# Patient Record
Sex: MALE | Race: WHITE
[De-identification: names, ages, dates, MRNs, and addresses within clinical notes are randomized per-mention and may not be internally consistent; named-entity substitution may affect disease eponyms.]

---

## 2018-01-19 ENCOUNTER — HOSPITAL ENCOUNTER (OUTPATIENT)
Age: 68
End: 2018-01-19
Payer: MEDICARE

## 2018-01-19 DIAGNOSIS — R10.30: Primary | ICD-10-CM

## 2018-01-19 PROCEDURE — 74176 CT ABD & PELVIS W/O CONTRAST: CPT

## 2018-08-07 ENCOUNTER — HOSPITAL ENCOUNTER (OUTPATIENT)
Age: 68
End: 2018-08-07
Payer: MEDICARE

## 2018-08-07 DIAGNOSIS — R94.31: ICD-10-CM

## 2018-08-07 DIAGNOSIS — R06.09: Primary | ICD-10-CM

## 2018-08-07 DIAGNOSIS — R42: ICD-10-CM

## 2018-08-07 PROCEDURE — A9502 TC99M TETROFOSMIN: HCPCS

## 2018-08-07 PROCEDURE — 78452 HT MUSCLE IMAGE SPECT MULT: CPT

## 2018-08-07 PROCEDURE — 93306 TTE W/DOPPLER COMPLETE: CPT

## 2018-08-07 PROCEDURE — 93017 CV STRESS TEST TRACING ONLY: CPT

## 2018-10-11 ENCOUNTER — HOSPITAL ENCOUNTER (OUTPATIENT)
Age: 68
End: 2018-10-11
Payer: MEDICARE

## 2018-10-11 DIAGNOSIS — Z12.5: Primary | ICD-10-CM

## 2018-10-11 DIAGNOSIS — N39.9: ICD-10-CM

## 2018-10-11 DIAGNOSIS — N40.0: ICD-10-CM

## 2018-10-11 PROCEDURE — 84153 ASSAY OF PSA TOTAL: CPT

## 2018-10-11 PROCEDURE — 84154 ASSAY OF PSA FREE: CPT

## 2018-10-11 PROCEDURE — 36415 COLL VENOUS BLD VENIPUNCTURE: CPT

## 2018-10-12 LAB — PSA, FREE: 0.11 NG/ML

## 2019-07-16 ENCOUNTER — HOSPITAL ENCOUNTER (OUTPATIENT)
Age: 69
End: 2019-07-16
Payer: MEDICARE

## 2019-07-16 DIAGNOSIS — M54.6: Primary | ICD-10-CM

## 2019-07-16 PROCEDURE — 72072 X-RAY EXAM THORAC SPINE 3VWS: CPT

## 2019-07-16 PROCEDURE — 71046 X-RAY EXAM CHEST 2 VIEWS: CPT

## 2019-07-16 PROCEDURE — 71101 X-RAY EXAM UNILAT RIBS/CHEST: CPT

## 2019-09-13 ENCOUNTER — ON CAMPUS - OUTPATIENT (OUTPATIENT)
Dept: RURAL HOSPITAL 6 | Facility: HOSPITAL | Age: 69
End: 2019-09-13

## 2019-09-13 DIAGNOSIS — K64.1 SECOND DEGREE HEMORRHOIDS: ICD-10-CM

## 2019-09-13 DIAGNOSIS — D12.0 BENIGN NEOPLASM OF CECUM: ICD-10-CM

## 2019-09-13 DIAGNOSIS — D12.3 BENIGN NEOPLASM OF TRANSVERSE COLON: ICD-10-CM

## 2019-09-13 DIAGNOSIS — D12.5 BENIGN NEOPLASM OF SIGMOID COLON: ICD-10-CM

## 2019-09-13 DIAGNOSIS — R63.4 ABNORMAL WEIGHT LOSS: ICD-10-CM

## 2019-09-13 DIAGNOSIS — R10.30 LOWER ABDOMINAL PAIN, UNSPECIFIED: ICD-10-CM

## 2019-09-13 PROCEDURE — 45385 COLONOSCOPY W/LESION REMOVAL: CPT | Performed by: INTERNAL MEDICINE

## 2020-01-15 ENCOUNTER — HOSPITAL ENCOUNTER (OUTPATIENT)
Age: 70
End: 2020-01-15
Payer: MEDICARE

## 2020-01-15 DIAGNOSIS — Z12.2: ICD-10-CM

## 2020-01-15 DIAGNOSIS — Z87.891: Primary | ICD-10-CM

## 2020-02-06 ENCOUNTER — HOSPITAL ENCOUNTER (OUTPATIENT)
Age: 70
End: 2020-02-06
Payer: MEDICARE

## 2020-02-06 DIAGNOSIS — N13.9: Primary | ICD-10-CM

## 2020-02-06 PROCEDURE — 87086 URINE CULTURE/COLONY COUNT: CPT

## 2020-02-06 PROCEDURE — 87186 SC STD MICRODIL/AGAR DIL: CPT

## 2020-02-06 PROCEDURE — 87088 URINE BACTERIA CULTURE: CPT

## 2020-08-05 ENCOUNTER — HOSPITAL ENCOUNTER (OUTPATIENT)
Dept: HOSPITAL 22 - PT | Age: 70
Discharge: HOME | End: 2020-08-05
Payer: MEDICARE

## 2020-08-05 DIAGNOSIS — L89.301: Primary | ICD-10-CM

## 2020-08-05 PROCEDURE — 97162 PT EVAL MOD COMPLEX 30 MIN: CPT

## 2020-10-21 ENCOUNTER — HOSPITAL ENCOUNTER (OUTPATIENT)
Age: 70
End: 2020-10-21
Payer: MEDICARE

## 2020-10-21 DIAGNOSIS — L73.2: Primary | ICD-10-CM

## 2020-10-21 DIAGNOSIS — Z01.818: ICD-10-CM

## 2020-10-21 LAB
ALBUMIN LEVEL: 3.9 G/DL (ref 3.5–5)
ALBUMIN/GLOB SERPL: 1.2 {RATIO} (ref 1.1–1.8)
ALP ISO SERPL-ACNC: 91 U/L (ref 38–126)
ALT SERPLBLD-CCNC: 11 U/L (ref 12–78)
ANION GAP SERPL CALC-SCNC: 13.5 MEQ/L (ref 5–15)
AST SERPL QL: 22 U/L (ref 17–59)
BILIRUBIN,TOTAL: 0.3 MG/DL (ref 0.2–1.3)
BUN SERPL-MCNC: 29 MG/DL (ref 9–20)
CALCIUM SPEC-MCNC: 8.9 MG/DL (ref 8.4–10.2)
CHLORIDE SPEC-SCNC: 111 MMOL/L (ref 98–107)
CO2 SERPL-SCNC: 23 MMOL/L (ref 22–30)
CREAT BLD-SCNC: 3.2 MG/DL (ref 0.66–1.25)
ESTIMATED GLOMERULAR FILT RATE: 19 ML/MIN (ref 60–?)
GFR (AFRICAN AMERICAN): 23 ML/MIN (ref 60–?)
GLOBULIN SER CALC-MCNC: 3.3 G/DL (ref 1.3–3.2)
GLUCOSE: 96 MG/DL (ref 74–100)
HCT VFR BLD CALC: 36.9 % (ref 42–52)
HGB BLD-MCNC: 11.3 G/DL (ref 14.1–18)
MCHC RBC-ENTMCNC: 30.7 G/DL (ref 31.8–35.4)
MCV RBC: 93.9 FL (ref 80–94)
MEAN CORPUSCULAR HEMOGLOBIN: 28.9 PG (ref 27–31.2)
PLATELET # BLD: 278 K/MM3 (ref 142–424)
POTASSIUM: 4.5 MMOL/L (ref 3.5–5.1)
PROT SERPL-MCNC: 7.2 G/DL (ref 6.3–8.2)
RBC # BLD AUTO: 3.93 M/MM3 (ref 4.6–6.2)
SODIUM SPEC-SCNC: 143 MMOL/L (ref 136–145)
WBC # BLD AUTO: 13.1 K/MM3 (ref 4.8–10.8)

## 2020-10-21 PROCEDURE — 86328 IA NFCT AB SARSCOV2 COVID19: CPT

## 2020-10-21 PROCEDURE — 93005 ELECTROCARDIOGRAM TRACING: CPT

## 2020-10-21 PROCEDURE — 80053 COMPREHEN METABOLIC PANEL: CPT

## 2020-10-21 PROCEDURE — 36415 COLL VENOUS BLD VENIPUNCTURE: CPT

## 2020-10-21 PROCEDURE — 85025 COMPLETE CBC W/AUTO DIFF WBC: CPT

## 2020-10-23 ENCOUNTER — HOSPITAL ENCOUNTER (OUTPATIENT)
Dept: HOSPITAL 22 - OR | Age: 70
Discharge: HOME | End: 2020-10-23
Payer: MEDICARE

## 2020-10-23 VITALS
OXYGEN SATURATION: 97 % | DIASTOLIC BLOOD PRESSURE: 65 MMHG | HEART RATE: 69 BPM | RESPIRATION RATE: 16 BRPM | TEMPERATURE: 97.52 F | SYSTOLIC BLOOD PRESSURE: 124 MMHG

## 2020-10-23 VITALS
RESPIRATION RATE: 16 BRPM | DIASTOLIC BLOOD PRESSURE: 68 MMHG | TEMPERATURE: 98.7 F | SYSTOLIC BLOOD PRESSURE: 136 MMHG | OXYGEN SATURATION: 98 % | HEART RATE: 68 BPM

## 2020-10-23 VITALS
SYSTOLIC BLOOD PRESSURE: 139 MMHG | DIASTOLIC BLOOD PRESSURE: 62 MMHG | HEART RATE: 67 BPM | TEMPERATURE: 98.78 F | OXYGEN SATURATION: 98 % | RESPIRATION RATE: 16 BRPM

## 2020-10-23 VITALS — SYSTOLIC BLOOD PRESSURE: 139 MMHG | DIASTOLIC BLOOD PRESSURE: 62 MMHG | TEMPERATURE: 98.78 F | HEART RATE: 67 BPM

## 2020-10-23 VITALS
RESPIRATION RATE: 16 BRPM | HEART RATE: 62 BPM | DIASTOLIC BLOOD PRESSURE: 69 MMHG | SYSTOLIC BLOOD PRESSURE: 119 MMHG | OXYGEN SATURATION: 97 %

## 2020-10-23 VITALS
OXYGEN SATURATION: 97 % | RESPIRATION RATE: 18 BRPM | TEMPERATURE: 98.7 F | DIASTOLIC BLOOD PRESSURE: 74 MMHG | SYSTOLIC BLOOD PRESSURE: 131 MMHG | HEART RATE: 68 BPM

## 2020-10-23 VITALS
HEART RATE: 64 BPM | DIASTOLIC BLOOD PRESSURE: 88 MMHG | RESPIRATION RATE: 18 BRPM | SYSTOLIC BLOOD PRESSURE: 155 MMHG | OXYGEN SATURATION: 97 % | TEMPERATURE: 98.7 F

## 2020-10-23 VITALS
DIASTOLIC BLOOD PRESSURE: 66 MMHG | SYSTOLIC BLOOD PRESSURE: 106 MMHG | TEMPERATURE: 97.52 F | HEART RATE: 61 BPM | RESPIRATION RATE: 18 BRPM | OXYGEN SATURATION: 95 %

## 2020-10-23 VITALS
RESPIRATION RATE: 14 BRPM | HEART RATE: 62 BPM | OXYGEN SATURATION: 97 % | DIASTOLIC BLOOD PRESSURE: 64 MMHG | SYSTOLIC BLOOD PRESSURE: 117 MMHG

## 2020-10-23 VITALS
DIASTOLIC BLOOD PRESSURE: 73 MMHG | RESPIRATION RATE: 18 BRPM | SYSTOLIC BLOOD PRESSURE: 132 MMHG | OXYGEN SATURATION: 95 % | HEART RATE: 66 BPM | TEMPERATURE: 98.7 F

## 2020-10-23 VITALS
OXYGEN SATURATION: 97 % | SYSTOLIC BLOOD PRESSURE: 133 MMHG | RESPIRATION RATE: 16 BRPM | DIASTOLIC BLOOD PRESSURE: 96 MMHG | HEART RATE: 62 BPM

## 2020-10-23 VITALS — BODY MASS INDEX: 24.9 KG/M2

## 2020-10-23 VITALS — RESPIRATION RATE: 20 BRPM

## 2020-10-23 DIAGNOSIS — E78.5: ICD-10-CM

## 2020-10-23 DIAGNOSIS — E07.9: ICD-10-CM

## 2020-10-23 DIAGNOSIS — J44.9: ICD-10-CM

## 2020-10-23 DIAGNOSIS — Z83.438: ICD-10-CM

## 2020-10-23 DIAGNOSIS — I10: ICD-10-CM

## 2020-10-23 DIAGNOSIS — L02.31: ICD-10-CM

## 2020-10-23 DIAGNOSIS — Z72.0: ICD-10-CM

## 2020-10-23 DIAGNOSIS — L73.2: Primary | ICD-10-CM

## 2020-10-23 DIAGNOSIS — Z79.899: ICD-10-CM

## 2020-10-23 DIAGNOSIS — Z79.82: ICD-10-CM

## 2020-10-23 DIAGNOSIS — Z83.3: ICD-10-CM

## 2020-10-23 PROCEDURE — 87077 CULTURE AEROBIC IDENTIFY: CPT

## 2020-10-23 PROCEDURE — 88304 TISSUE EXAM BY PATHOLOGIST: CPT

## 2020-10-23 PROCEDURE — 10061 I&D ABSCESS COMP/MULTIPLE: CPT

## 2020-10-23 PROCEDURE — 96374 THER/PROPH/DIAG INJ IV PUSH: CPT

## 2020-10-23 PROCEDURE — 87186 SC STD MICRODIL/AGAR DIL: CPT

## 2020-10-23 PROCEDURE — 87070 CULTURE OTHR SPECIMN AEROBIC: CPT

## 2020-10-23 PROCEDURE — 87205 SMEAR GRAM STAIN: CPT

## 2020-10-23 PROCEDURE — 87075 CULTR BACTERIA EXCEPT BLOOD: CPT

## 2021-06-09 ENCOUNTER — HOSPITAL ENCOUNTER (OUTPATIENT)
Age: 71
End: 2021-06-09
Payer: MEDICARE

## 2021-06-09 DIAGNOSIS — E78.5: Primary | ICD-10-CM

## 2021-06-09 DIAGNOSIS — J43.1: ICD-10-CM

## 2021-06-09 DIAGNOSIS — N40.1: ICD-10-CM

## 2021-06-09 DIAGNOSIS — Z12.5: ICD-10-CM

## 2021-06-09 DIAGNOSIS — E03.9: ICD-10-CM

## 2021-06-09 LAB
ALBUMIN LEVEL: 4.2 G/DL (ref 3.5–5)
ALBUMIN/GLOB SERPL: 1.3 {RATIO} (ref 1.1–1.8)
ALP ISO SERPL-ACNC: 96 U/L (ref 38–126)
ALT SERPLBLD-CCNC: 12 U/L (ref 12–78)
ANION GAP SERPL CALC-SCNC: 16.4 MEQ/L (ref 5–15)
AST SERPL QL: 24 U/L (ref 17–59)
BILIRUBIN,TOTAL: 0.5 MG/DL (ref 0.2–1.3)
BUN SERPL-MCNC: 36 MG/DL (ref 9–20)
CALCIUM SPEC-MCNC: 9 MG/DL (ref 8.4–10.2)
CHLORIDE SPEC-SCNC: 106 MMOL/L (ref 98–107)
CHOLEST SPEC-SCNC: 167 MG/DL (ref 140–200)
CO2 SERPL-SCNC: 23 MMOL/L (ref 22–30)
CREAT BLD-SCNC: 2.9 MG/DL (ref 0.66–1.25)
ESTIMATED GLOMERULAR FILT RATE: 22 ML/MIN (ref 60–?)
GFR (AFRICAN AMERICAN): 26 ML/MIN (ref 60–?)
GLOBULIN SER CALC-MCNC: 3.2 G/DL (ref 1.3–3.2)
GLUCOSE: 96 MG/DL (ref 74–100)
HCT VFR BLD CALC: 38.5 % (ref 42–52)
HDLC SERPL-MCNC: 46 MG/DL (ref 40–60)
HGB BLD-MCNC: 12 G/DL (ref 14.1–18)
MCHC RBC-ENTMCNC: 31 G/DL (ref 31.8–35.4)
MCV RBC: 96.5 FL (ref 80–94)
MEAN CORPUSCULAR HEMOGLOBIN: 29.9 PG (ref 27–31.2)
PLATELET # BLD: 265 K/MM3 (ref 142–424)
POTASSIUM: 5.4 MMOL/L (ref 3.5–5.1)
PROT SERPL-MCNC: 7.4 G/DL (ref 6.3–8.2)
RBC # BLD AUTO: 4 M/MM3 (ref 4.6–6.2)
SODIUM SPEC-SCNC: 140 MMOL/L (ref 136–145)
TRIGLYCERIDES: 95 MG/DL (ref 30–150)
TSH SERPL-ACNC: 2.65 UIU/ML (ref 0.47–4.68)
WBC # BLD AUTO: 14.2 K/MM3 (ref 4.8–10.8)

## 2021-06-09 PROCEDURE — 85025 COMPLETE CBC W/AUTO DIFF WBC: CPT

## 2021-06-09 PROCEDURE — 80061 LIPID PANEL: CPT

## 2021-06-09 PROCEDURE — 80053 COMPREHEN METABOLIC PANEL: CPT

## 2021-06-09 PROCEDURE — 84443 ASSAY THYROID STIM HORMONE: CPT

## 2021-06-09 PROCEDURE — G0103 PSA SCREENING: HCPCS

## 2021-10-15 ENCOUNTER — HOSPITAL ENCOUNTER (OUTPATIENT)
Age: 71
End: 2021-10-15
Payer: MEDICARE

## 2021-10-15 DIAGNOSIS — Z12.2: ICD-10-CM

## 2021-10-15 DIAGNOSIS — Z87.891: Primary | ICD-10-CM

## 2021-10-15 PROCEDURE — 71271 CT THORAX LUNG CANCER SCR C-: CPT

## 2021-10-19 ENCOUNTER — HOSPITAL ENCOUNTER (OUTPATIENT)
Dept: HOSPITAL 22 - ER | Age: 71
Setting detail: OBSERVATION
LOS: 2 days | Discharge: HOME | End: 2021-10-21
Payer: MEDICARE

## 2021-10-19 VITALS — BODY MASS INDEX: 23.4 KG/M2 | BODY MASS INDEX: 23 KG/M2 | BODY MASS INDEX: 24.3 KG/M2

## 2021-10-19 VITALS — DIASTOLIC BLOOD PRESSURE: 68 MMHG | SYSTOLIC BLOOD PRESSURE: 114 MMHG | OXYGEN SATURATION: 95 % | HEART RATE: 68 BPM

## 2021-10-19 VITALS
RESPIRATION RATE: 18 BRPM | OXYGEN SATURATION: 94 % | TEMPERATURE: 98.42 F | DIASTOLIC BLOOD PRESSURE: 90 MMHG | SYSTOLIC BLOOD PRESSURE: 159 MMHG | HEART RATE: 83 BPM

## 2021-10-19 VITALS
HEART RATE: 68 BPM | RESPIRATION RATE: 20 BRPM | SYSTOLIC BLOOD PRESSURE: 120 MMHG | DIASTOLIC BLOOD PRESSURE: 66 MMHG | OXYGEN SATURATION: 95 %

## 2021-10-19 VITALS — OXYGEN SATURATION: 94 % | DIASTOLIC BLOOD PRESSURE: 71 MMHG | SYSTOLIC BLOOD PRESSURE: 128 MMHG | HEART RATE: 81 BPM

## 2021-10-19 VITALS
RESPIRATION RATE: 18 BRPM | TEMPERATURE: 99.32 F | OXYGEN SATURATION: 91 % | DIASTOLIC BLOOD PRESSURE: 70 MMHG | SYSTOLIC BLOOD PRESSURE: 114 MMHG | HEART RATE: 83 BPM

## 2021-10-19 VITALS — OXYGEN SATURATION: 93 % | DIASTOLIC BLOOD PRESSURE: 63 MMHG | HEART RATE: 76 BPM | SYSTOLIC BLOOD PRESSURE: 118 MMHG

## 2021-10-19 VITALS
RESPIRATION RATE: 18 BRPM | TEMPERATURE: 98.24 F | OXYGEN SATURATION: 99 % | SYSTOLIC BLOOD PRESSURE: 130 MMHG | DIASTOLIC BLOOD PRESSURE: 72 MMHG | HEART RATE: 81 BPM

## 2021-10-19 VITALS
SYSTOLIC BLOOD PRESSURE: 142 MMHG | DIASTOLIC BLOOD PRESSURE: 77 MMHG | RESPIRATION RATE: 20 BRPM | HEART RATE: 69 BPM | OXYGEN SATURATION: 95 %

## 2021-10-19 VITALS — OXYGEN SATURATION: 94 % | SYSTOLIC BLOOD PRESSURE: 120 MMHG | HEART RATE: 87 BPM | DIASTOLIC BLOOD PRESSURE: 66 MMHG

## 2021-10-19 VITALS — HEART RATE: 80 BPM

## 2021-10-19 DIAGNOSIS — I13.0: ICD-10-CM

## 2021-10-19 DIAGNOSIS — J44.0: ICD-10-CM

## 2021-10-19 DIAGNOSIS — I50.22: ICD-10-CM

## 2021-10-19 DIAGNOSIS — E11.22: ICD-10-CM

## 2021-10-19 DIAGNOSIS — E03.9: ICD-10-CM

## 2021-10-19 DIAGNOSIS — Z20.822: ICD-10-CM

## 2021-10-19 DIAGNOSIS — N18.4: ICD-10-CM

## 2021-10-19 DIAGNOSIS — K85.90: Primary | ICD-10-CM

## 2021-10-19 DIAGNOSIS — J20.9: ICD-10-CM

## 2021-10-19 DIAGNOSIS — Z79.899: ICD-10-CM

## 2021-10-19 DIAGNOSIS — Z99.81: ICD-10-CM

## 2021-10-19 DIAGNOSIS — N32.0: ICD-10-CM

## 2021-10-19 DIAGNOSIS — F17.210: ICD-10-CM

## 2021-10-19 LAB
ALBUMIN LEVEL: 3.7 G/DL (ref 3.5–5)
ALP ISO SERPL-ACNC: 78 U/L (ref 38–126)
ALT SERPLBLD-CCNC: 13 U/L (ref 12–78)
ANION GAP SERPL CALC-SCNC: 12.2 MEQ/L (ref 5–15)
AST SERPL QL: 27 U/L (ref 17–59)
BACTERIA URNS QL MICRO: (no result) /LPF
BILIRUB DIRECT SERPL-MCNC: 0.3 MG/DL (ref 0–0.4)
BILIRUB INDIRECT SERPL-MCNC: 0 MG/DL (ref 0–0.9)
BILIRUB INDIRECT SERPL-MCNC: 0 MG/DL (ref 0–1.1)
BILIRUBIN,TOTAL: 0.3 MG/DL (ref 0.2–1.3)
BUN SERPL-MCNC: 28 MG/DL (ref 9–20)
CALCIUM SPEC-MCNC: 8.5 MG/DL (ref 8.4–10.2)
CELLS COUNTED: 100
CHLORIDE SPEC-SCNC: 104 MMOL/L (ref 98–107)
CO2 SERPL-SCNC: 25 MMOL/L (ref 22–30)
COLOR UR: YELLOW
CREAT BLD-SCNC: 2.4 MG/DL (ref 0.66–1.25)
CREATININE CLEARANCE ESTIMATED: 32 ML/MIN (ref 50–200)
ESTIMATED GLOMERULAR FILT RATE: 27 ML/MIN (ref 60–?)
GFR (AFRICAN AMERICAN): 33 ML/MIN (ref 60–?)
GLUCOSE: 95 MG/DL (ref 74–100)
HCT VFR BLD CALC: 38 % (ref 42–52)
HGB BLD-MCNC: 11.6 G/DL (ref 14.1–18)
LEUKOCYTE ESTERASE UR QL STRIP: (no result)
LIPASE: 532 U/L (ref 23–300)
MANUAL DIFFERENTIAL: (no result)
MCHC RBC-ENTMCNC: 30.6 G/DL (ref 31.8–35.4)
MCV RBC: 96.8 FL (ref 80–94)
MEAN CORPUSCULAR HEMOGLOBIN: 29.7 PG (ref 27–31.2)
MICRO URNS: (no result)
PH UR: 7 [PH] (ref 5–8.5)
PLATELET # BLD: 292 K/MM3 (ref 142–424)
POTASSIUM: 4.2 MMOL/L (ref 3.5–5.1)
PROT SERPL-MCNC: 7.1 G/DL (ref 6.3–8.2)
RBC # BLD AUTO: 3.93 M/MM3 (ref 4.6–6.2)
SODIUM SPEC-SCNC: 137 MMOL/L (ref 136–145)
SP GR UR: 1.01 (ref 1–1.03)
TROPONIN I: < 0.01 NG/ML (ref 0–0.03)
TROPONIN I: < 0.01 NG/ML (ref 0–0.03)
UROBILINOGEN UR QL: 0.2 EU/DL
WBC # BLD AUTO: 15.6 K/MM3 (ref 4.8–10.8)
WBC # UR: (no result) #/HPF (ref 0–3)

## 2021-10-19 PROCEDURE — 74176 CT ABD & PELVIS W/O CONTRAST: CPT

## 2021-10-19 PROCEDURE — 85025 COMPLETE CBC W/AUTO DIFF WBC: CPT

## 2021-10-19 PROCEDURE — 85007 BL SMEAR W/DIFF WBC COUNT: CPT

## 2021-10-19 PROCEDURE — 36415 COLL VENOUS BLD VENIPUNCTURE: CPT

## 2021-10-19 PROCEDURE — U0003 INFECTIOUS AGENT DETECTION BY NUCLEIC ACID (DNA OR RNA); SEVERE ACUTE RESPIRATORY SYNDROME CORONAVIRUS 2 (SARS-COV-2) (CORONAVIRUS DISEASE [COVID-19]), AMPLIFIED PROBE TECHNIQUE, MAKING USE OF HIGH THROUGHPUT TECHNOLOGIES AS DESCRIBED BY CMS-2020-01-R: HCPCS

## 2021-10-19 PROCEDURE — 87086 URINE CULTURE/COLONY COUNT: CPT

## 2021-10-19 PROCEDURE — U0005 INFEC AGEN DETEC AMPLI PROBE: HCPCS

## 2021-10-19 PROCEDURE — 83690 ASSAY OF LIPASE: CPT

## 2021-10-19 PROCEDURE — 99284 EMERGENCY DEPT VISIT MOD MDM: CPT

## 2021-10-19 PROCEDURE — 96374 THER/PROPH/DIAG INJ IV PUSH: CPT

## 2021-10-19 PROCEDURE — 94640 AIRWAY INHALATION TREATMENT: CPT

## 2021-10-19 PROCEDURE — 80061 LIPID PANEL: CPT

## 2021-10-19 PROCEDURE — G0378 HOSPITAL OBSERVATION PER HR: HCPCS

## 2021-10-19 PROCEDURE — C9803 HOPD COVID-19 SPEC COLLECT: HCPCS

## 2021-10-19 PROCEDURE — 94760 N-INVAS EAR/PLS OXIMETRY 1: CPT

## 2021-10-19 PROCEDURE — 76700 US EXAM ABDOM COMPLETE: CPT

## 2021-10-19 PROCEDURE — 84484 ASSAY OF TROPONIN QUANT: CPT

## 2021-10-19 PROCEDURE — 80053 COMPREHEN METABOLIC PANEL: CPT

## 2021-10-19 PROCEDURE — 80048 BASIC METABOLIC PNL TOTAL CA: CPT

## 2021-10-19 PROCEDURE — 80076 HEPATIC FUNCTION PANEL: CPT

## 2021-10-19 PROCEDURE — 93306 TTE W/DOPPLER COMPLETE: CPT

## 2021-10-19 PROCEDURE — 96375 TX/PRO/DX INJ NEW DRUG ADDON: CPT

## 2021-10-19 PROCEDURE — 71045 X-RAY EXAM CHEST 1 VIEW: CPT

## 2021-10-19 PROCEDURE — 93005 ELECTROCARDIOGRAM TRACING: CPT

## 2021-10-19 PROCEDURE — 81001 URINALYSIS AUTO W/SCOPE: CPT

## 2021-10-20 VITALS
SYSTOLIC BLOOD PRESSURE: 110 MMHG | TEMPERATURE: 97.9 F | HEART RATE: 93 BPM | RESPIRATION RATE: 16 BRPM | OXYGEN SATURATION: 94 % | DIASTOLIC BLOOD PRESSURE: 88 MMHG

## 2021-10-20 VITALS
DIASTOLIC BLOOD PRESSURE: 74 MMHG | RESPIRATION RATE: 16 BRPM | OXYGEN SATURATION: 95 % | TEMPERATURE: 98.42 F | SYSTOLIC BLOOD PRESSURE: 134 MMHG | HEART RATE: 90 BPM

## 2021-10-20 VITALS
RESPIRATION RATE: 20 BRPM | HEART RATE: 95 BPM | TEMPERATURE: 99.14 F | SYSTOLIC BLOOD PRESSURE: 111 MMHG | OXYGEN SATURATION: 93 % | DIASTOLIC BLOOD PRESSURE: 64 MMHG

## 2021-10-20 VITALS
OXYGEN SATURATION: 93 % | TEMPERATURE: 99.14 F | SYSTOLIC BLOOD PRESSURE: 106 MMHG | RESPIRATION RATE: 18 BRPM | HEART RATE: 92 BPM | DIASTOLIC BLOOD PRESSURE: 65 MMHG

## 2021-10-20 VITALS
RESPIRATION RATE: 16 BRPM | OXYGEN SATURATION: 95 % | TEMPERATURE: 98.7 F | HEART RATE: 86 BPM | SYSTOLIC BLOOD PRESSURE: 117 MMHG | DIASTOLIC BLOOD PRESSURE: 76 MMHG

## 2021-10-20 VITALS — OXYGEN SATURATION: 95 %

## 2021-10-20 VITALS — HEART RATE: 100 BPM

## 2021-10-20 LAB
TROPONIN I: 0.03 NG/ML (ref 0–0.03)
TROPONIN I: 0.05 NG/ML (ref 0–0.03)
TROPONIN I: 0.1 NG/ML (ref 0–0.03)

## 2021-10-21 VITALS
SYSTOLIC BLOOD PRESSURE: 102 MMHG | TEMPERATURE: 98 F | OXYGEN SATURATION: 92 % | HEART RATE: 84 BPM | RESPIRATION RATE: 23 BRPM | DIASTOLIC BLOOD PRESSURE: 68 MMHG

## 2021-10-21 VITALS
OXYGEN SATURATION: 88 % | HEART RATE: 83 BPM | DIASTOLIC BLOOD PRESSURE: 70 MMHG | SYSTOLIC BLOOD PRESSURE: 107 MMHG | TEMPERATURE: 98.5 F | RESPIRATION RATE: 18 BRPM

## 2021-10-21 VITALS
RESPIRATION RATE: 15 BRPM | SYSTOLIC BLOOD PRESSURE: 116 MMHG | HEART RATE: 80 BPM | OXYGEN SATURATION: 95 % | DIASTOLIC BLOOD PRESSURE: 73 MMHG | TEMPERATURE: 100.4 F

## 2021-10-21 VITALS
TEMPERATURE: 98.1 F | OXYGEN SATURATION: 88 % | DIASTOLIC BLOOD PRESSURE: 79 MMHG | RESPIRATION RATE: 17 BRPM | SYSTOLIC BLOOD PRESSURE: 142 MMHG | HEART RATE: 80 BPM

## 2021-10-21 VITALS — OXYGEN SATURATION: 97 %

## 2021-10-21 LAB
ALBUMIN LEVEL: 3 G/DL (ref 3.5–5)
ALBUMIN/GLOB SERPL: 1 {RATIO} (ref 1.1–1.8)
ALP ISO SERPL-ACNC: 79 U/L (ref 38–126)
ALT SERPLBLD-CCNC: 9 U/L (ref 12–78)
ANION GAP SERPL CALC-SCNC: 12.6 MEQ/L (ref 5–15)
AST SERPL QL: 28 U/L (ref 17–59)
BILIRUBIN,TOTAL: 0.3 MG/DL (ref 0.2–1.3)
BUN SERPL-MCNC: 31 MG/DL (ref 9–20)
CALCIUM SPEC-MCNC: 8.5 MG/DL (ref 8.4–10.2)
CELLS COUNTED: 100
CHLORIDE SPEC-SCNC: 111 MMOL/L (ref 98–107)
CHOLEST SPEC-SCNC: 119 MG/DL (ref 140–200)
CO2 SERPL-SCNC: 18 MMOL/L (ref 22–30)
CREAT BLD-SCNC: 2.8 MG/DL (ref 0.66–1.25)
CREATININE CLEARANCE ESTIMATED: 28 ML/MIN (ref 50–200)
ESTIMATED GLOMERULAR FILT RATE: 23 ML/MIN (ref 60–?)
GFR (AFRICAN AMERICAN): 27 ML/MIN (ref 60–?)
GLOBULIN SER CALC-MCNC: 3.1 G/DL (ref 1.3–3.2)
GLUCOSE: 87 MG/DL (ref 74–100)
HCT VFR BLD CALC: 33.6 % (ref 42–52)
HDLC SERPL-MCNC: 42 MG/DL (ref 40–60)
HGB BLD-MCNC: 10.1 G/DL (ref 14.1–18)
MANUAL DIFFERENTIAL: (no result)
MCHC RBC-ENTMCNC: 30.1 G/DL (ref 31.8–35.4)
MCV RBC: 98.5 FL (ref 80–94)
MEAN CORPUSCULAR HEMOGLOBIN: 29.6 PG (ref 27–31.2)
PLATELET # BLD: 202 K/MM3 (ref 142–424)
POTASSIUM: 4.6 MMOL/L (ref 3.5–5.1)
PROT SERPL-MCNC: 6.1 G/DL (ref 6.3–8.2)
RBC # BLD AUTO: 3.41 M/MM3 (ref 4.6–6.2)
SODIUM SPEC-SCNC: 137 MMOL/L (ref 136–145)
TRIGLYCERIDES: 105 MG/DL (ref 30–150)
WBC # BLD AUTO: 16 K/MM3 (ref 4.8–10.8)

## 2021-10-27 ENCOUNTER — HOSPITAL ENCOUNTER (OUTPATIENT)
Age: 71
End: 2021-10-27
Payer: MEDICARE

## 2021-10-27 DIAGNOSIS — R07.9: Primary | ICD-10-CM

## 2021-10-27 LAB
ALBUMIN LEVEL: 3.5 G/DL (ref 3.5–5)
ALBUMIN/GLOB SERPL: 1.1 {RATIO} (ref 1.1–1.8)
ALP ISO SERPL-ACNC: 88 U/L (ref 38–126)
ALT SERPLBLD-CCNC: 17 U/L (ref 12–78)
ANION GAP SERPL CALC-SCNC: 16 MEQ/L (ref 5–15)
AST SERPL QL: 31 U/L (ref 17–59)
BILIRUBIN,TOTAL: 0.2 MG/DL (ref 0.2–1.3)
BUN SERPL-MCNC: 26 MG/DL (ref 9–20)
CALCIUM SPEC-MCNC: 8.8 MG/DL (ref 8.4–10.2)
CELLS COUNTED: 100
CHLORIDE SPEC-SCNC: 102 MMOL/L (ref 98–107)
CO2 SERPL-SCNC: 22 MMOL/L (ref 22–30)
CREAT BLD-SCNC: 2.4 MG/DL (ref 0.66–1.25)
ESTIMATED GLOMERULAR FILT RATE: 27 ML/MIN (ref 60–?)
GFR (AFRICAN AMERICAN): 33 ML/MIN (ref 60–?)
GLOBULIN SER CALC-MCNC: 3.3 G/DL (ref 1.3–3.2)
GLUCOSE: 100 MG/DL (ref 74–100)
HCT VFR BLD CALC: 34.8 % (ref 42–52)
HGB BLD-MCNC: 10.7 G/DL (ref 14.1–18)
HYPOCHROMIA BLD QL: (no result)
MACROCYTES BLD QL: (no result)
MANUAL DIFFERENTIAL: (no result)
MCHC RBC-ENTMCNC: 30.8 G/DL (ref 31.8–35.4)
MCV RBC: 97.4 FL (ref 80–94)
MEAN CORPUSCULAR HEMOGLOBIN: 30 PG (ref 27–31.2)
PLATELET # BLD: 358 K/MM3 (ref 142–424)
POTASSIUM: 5 MMOL/L (ref 3.5–5.1)
PROT SERPL-MCNC: 6.8 G/DL (ref 6.3–8.2)
RBC # BLD AUTO: 3.57 M/MM3 (ref 4.6–6.2)
SODIUM SPEC-SCNC: 135 MMOL/L (ref 136–145)
WBC # BLD AUTO: 15.3 K/MM3 (ref 4.8–10.8)

## 2021-10-27 PROCEDURE — 85007 BL SMEAR W/DIFF WBC COUNT: CPT

## 2021-10-27 PROCEDURE — 85025 COMPLETE CBC W/AUTO DIFF WBC: CPT

## 2021-10-27 PROCEDURE — 80053 COMPREHEN METABOLIC PANEL: CPT

## 2021-10-27 PROCEDURE — 36415 COLL VENOUS BLD VENIPUNCTURE: CPT

## 2021-10-28 ENCOUNTER — HOSPITAL ENCOUNTER (OUTPATIENT)
Dept: HOSPITAL 22 - CATHLAB | Age: 71
Discharge: HOME | End: 2021-10-28
Payer: MEDICARE

## 2021-10-28 VITALS
DIASTOLIC BLOOD PRESSURE: 64 MMHG | HEART RATE: 67 BPM | RESPIRATION RATE: 19 BRPM | OXYGEN SATURATION: 95 % | SYSTOLIC BLOOD PRESSURE: 118 MMHG

## 2021-10-28 VITALS
RESPIRATION RATE: 19 BRPM | SYSTOLIC BLOOD PRESSURE: 107 MMHG | HEART RATE: 69 BPM | DIASTOLIC BLOOD PRESSURE: 65 MMHG | OXYGEN SATURATION: 98 %

## 2021-10-28 VITALS
TEMPERATURE: 97.88 F | RESPIRATION RATE: 19 BRPM | HEART RATE: 74 BPM | DIASTOLIC BLOOD PRESSURE: 77 MMHG | SYSTOLIC BLOOD PRESSURE: 119 MMHG | OXYGEN SATURATION: 95 %

## 2021-10-28 VITALS
HEART RATE: 65 BPM | OXYGEN SATURATION: 96 % | RESPIRATION RATE: 19 BRPM | DIASTOLIC BLOOD PRESSURE: 63 MMHG | SYSTOLIC BLOOD PRESSURE: 107 MMHG

## 2021-10-28 VITALS — HEART RATE: 70 BPM | SYSTOLIC BLOOD PRESSURE: 106 MMHG | DIASTOLIC BLOOD PRESSURE: 64 MMHG | OXYGEN SATURATION: 99 %

## 2021-10-28 VITALS
SYSTOLIC BLOOD PRESSURE: 110 MMHG | DIASTOLIC BLOOD PRESSURE: 65 MMHG | OXYGEN SATURATION: 95 % | HEART RATE: 64 BPM | RESPIRATION RATE: 19 BRPM

## 2021-10-28 VITALS
OXYGEN SATURATION: 99 % | DIASTOLIC BLOOD PRESSURE: 68 MMHG | HEART RATE: 72 BPM | SYSTOLIC BLOOD PRESSURE: 113 MMHG | RESPIRATION RATE: 20 BRPM

## 2021-10-28 VITALS
RESPIRATION RATE: 19 BRPM | OXYGEN SATURATION: 98 % | SYSTOLIC BLOOD PRESSURE: 109 MMHG | HEART RATE: 61 BPM | DIASTOLIC BLOOD PRESSURE: 70 MMHG

## 2021-10-28 VITALS — BODY MASS INDEX: 23.4 KG/M2

## 2021-10-28 VITALS
SYSTOLIC BLOOD PRESSURE: 111 MMHG | RESPIRATION RATE: 17 BRPM | DIASTOLIC BLOOD PRESSURE: 73 MMHG | OXYGEN SATURATION: 98 % | HEART RATE: 63 BPM

## 2021-10-28 VITALS — HEART RATE: 71 BPM

## 2021-10-28 DIAGNOSIS — I25.119: Primary | ICD-10-CM

## 2021-10-28 DIAGNOSIS — I10: ICD-10-CM

## 2021-10-28 DIAGNOSIS — J44.9: ICD-10-CM

## 2021-10-28 DIAGNOSIS — F17.201: ICD-10-CM

## 2021-10-28 DIAGNOSIS — E78.5: ICD-10-CM

## 2021-10-28 PROCEDURE — 93458 L HRT ARTERY/VENTRICLE ANGIO: CPT

## 2021-10-28 PROCEDURE — C1769 GUIDE WIRE: HCPCS

## 2021-10-28 PROCEDURE — C1725 CATH, TRANSLUMIN NON-LASER: HCPCS

## 2021-10-28 PROCEDURE — 99152 MOD SED SAME PHYS/QHP 5/>YRS: CPT

## 2021-11-12 ENCOUNTER — HOSPITAL ENCOUNTER (OUTPATIENT)
Age: 71
End: 2021-11-12
Payer: MEDICARE

## 2021-11-12 DIAGNOSIS — N18.9: ICD-10-CM

## 2021-11-12 DIAGNOSIS — R94.31: ICD-10-CM

## 2021-11-12 DIAGNOSIS — I25.10: Primary | ICD-10-CM

## 2021-11-12 DIAGNOSIS — I50.22: ICD-10-CM

## 2021-11-12 DIAGNOSIS — I71.4: ICD-10-CM

## 2021-11-12 DIAGNOSIS — I73.9: ICD-10-CM

## 2021-11-12 DIAGNOSIS — R42: ICD-10-CM

## 2021-11-12 DIAGNOSIS — I34.0: ICD-10-CM

## 2021-11-12 DIAGNOSIS — I35.1: ICD-10-CM

## 2021-11-12 DIAGNOSIS — R10.9: ICD-10-CM

## 2021-11-12 PROCEDURE — 76705 ECHO EXAM OF ABDOMEN: CPT

## 2022-07-14 ENCOUNTER — HOSPITAL ENCOUNTER (OUTPATIENT)
Age: 72
End: 2022-07-14
Payer: MEDICARE

## 2022-07-14 DIAGNOSIS — I10: Primary | ICD-10-CM

## 2022-07-14 LAB
ANION GAP SERPL CALC-SCNC: 14.7 MEQ/L (ref 5–15)
BUN SERPL-MCNC: 35 MG/DL (ref 9–20)
CALCIUM SPEC-MCNC: 8.7 MG/DL (ref 8.4–10.2)
CHLORIDE SPEC-SCNC: 101 MMOL/L (ref 98–107)
CO2 SERPL-SCNC: 22 MMOL/L (ref 22–30)
CREAT BLD-SCNC: 2.7 MG/DL (ref 0.66–1.25)
ESTIMATED GLOMERULAR FILT RATE: 23 ML/MIN (ref 60–?)
GFR (AFRICAN AMERICAN): 28 ML/MIN (ref 60–?)
GLUCOSE: 98 MG/DL (ref 74–100)
POTASSIUM: 4.7 MMOL/L (ref 3.5–5.1)
SODIUM SPEC-SCNC: 133 MMOL/L (ref 136–145)

## 2022-07-14 PROCEDURE — 80048 BASIC METABOLIC PNL TOTAL CA: CPT

## 2022-10-12 ENCOUNTER — HOSPITAL ENCOUNTER (OUTPATIENT)
Age: 72
End: 2022-10-12
Payer: MEDICARE

## 2022-10-12 DIAGNOSIS — Z00.00: ICD-10-CM

## 2022-10-12 DIAGNOSIS — E03.9: Primary | ICD-10-CM

## 2022-10-12 DIAGNOSIS — I10: ICD-10-CM

## 2022-10-12 DIAGNOSIS — E78.5: ICD-10-CM

## 2022-10-12 LAB
ALBUMIN LEVEL: 3.6 G/DL (ref 3.5–5)
ALBUMIN/GLOB SERPL: 1.1 {RATIO} (ref 1.1–1.8)
ALP ISO SERPL-ACNC: 101 U/L (ref 38–126)
ALT SERPLBLD-CCNC: 13 U/L (ref 12–78)
ANION GAP SERPL CALC-SCNC: 14.8 MEQ/L (ref 5–15)
AST SERPL QL: 27 U/L (ref 17–59)
BILIRUBIN,TOTAL: 0.5 MG/DL (ref 0.2–1.3)
BUN SERPL-MCNC: 38 MG/DL (ref 9–20)
BURR CELLS: (no result)
CALCIUM SPEC-MCNC: 8.6 MG/DL (ref 8.4–10.2)
CELLS COUNTED: 100
CHLORIDE SPEC-SCNC: 97 MMOL/L (ref 98–107)
CHOLEST SPEC-SCNC: 155 MG/DL (ref 140–200)
CO2 SERPL-SCNC: 24 MMOL/L (ref 22–30)
CREAT BLD-SCNC: 2.6 MG/DL (ref 0.66–1.25)
ESTIMATED GLOMERULAR FILT RATE: 24 ML/MIN (ref 60–?)
GFR (AFRICAN AMERICAN): 30 ML/MIN (ref 60–?)
GLOBULIN SER CALC-MCNC: 3.4 G/DL (ref 1.3–3.2)
GLUCOSE: 105 MG/DL (ref 74–100)
HCT VFR BLD CALC: 37.4 % (ref 42–52)
HDLC SERPL-MCNC: 47 MG/DL (ref 40–60)
HGB BLD-MCNC: 11.7 G/DL (ref 14.1–18)
MANUAL DIFFERENTIAL: (no result)
MCHC RBC-ENTMCNC: 31.3 G/DL (ref 31.8–35.4)
MCV RBC: 93.3 FL (ref 80–94)
MEAN CORPUSCULAR HEMOGLOBIN: 29.2 PG (ref 27–31.2)
PLATELET # BLD: 396 K/MM3 (ref 142–424)
POTASSIUM: 4.8 MMOL/L (ref 3.5–5.1)
PROT SERPL-MCNC: 7 G/DL (ref 6.3–8.2)
RBC # BLD AUTO: 4.01 M/MM3 (ref 4.6–6.2)
SODIUM SPEC-SCNC: 131 MMOL/L (ref 136–145)
TRIGLYCERIDES: 65 MG/DL (ref 30–150)
TSH SERPL-ACNC: 2.65 UIU/ML (ref 0.47–4.68)
WBC # BLD AUTO: 15.1 K/MM3 (ref 4.8–10.8)

## 2022-10-12 PROCEDURE — 85025 COMPLETE CBC W/AUTO DIFF WBC: CPT

## 2022-10-12 PROCEDURE — 80061 LIPID PANEL: CPT

## 2022-10-12 PROCEDURE — 80053 COMPREHEN METABOLIC PANEL: CPT

## 2022-10-12 PROCEDURE — 85007 BL SMEAR W/DIFF WBC COUNT: CPT

## 2022-10-12 PROCEDURE — 84443 ASSAY THYROID STIM HORMONE: CPT

## 2022-10-19 ENCOUNTER — HOSPITAL ENCOUNTER (OUTPATIENT)
Age: 72
End: 2022-10-19
Payer: MEDICARE

## 2022-10-19 DIAGNOSIS — I70.213: ICD-10-CM

## 2022-10-19 DIAGNOSIS — I71.21: Primary | ICD-10-CM

## 2022-10-19 PROCEDURE — 93923 UPR/LXTR ART STDY 3+ LVLS: CPT

## 2022-10-19 PROCEDURE — 74176 CT ABD & PELVIS W/O CONTRAST: CPT

## 2022-10-19 PROCEDURE — 71250 CT THORAX DX C-: CPT

## 2023-02-17 ENCOUNTER — HOSPITAL ENCOUNTER (OUTPATIENT)
Dept: HOSPITAL 22 - RAD | Age: 73
End: 2023-02-17
Payer: MEDICARE

## 2023-02-17 DIAGNOSIS — M54.50: Primary | ICD-10-CM

## 2023-02-17 PROCEDURE — 72100 X-RAY EXAM L-S SPINE 2/3 VWS: CPT

## 2023-03-20 ENCOUNTER — HOSPITAL ENCOUNTER (OUTPATIENT)
Age: 73
End: 2023-03-20
Payer: MEDICARE

## 2023-03-20 DIAGNOSIS — M79.89: Primary | ICD-10-CM

## 2023-03-20 LAB
ANION GAP SERPL CALC-SCNC: 12.8 MEQ/L (ref 5–15)
BUN SERPL-MCNC: 36 MG/DL (ref 9–20)
CALCIUM SPEC-MCNC: 7.7 MG/DL (ref 8.4–10.2)
CHLORIDE SPEC-SCNC: 96 MMOL/L (ref 98–107)
CO2 SERPL-SCNC: 26 MMOL/L (ref 22–30)
CREAT BLD-SCNC: 2.5 MG/DL (ref 0.66–1.25)
CRP SERPL HS-MCNC: 2.6 MG/L (ref 0–4)
ESTIMATED GLOMERULAR FILT RATE: 26 ML/MIN (ref 60–?)
GFR (AFRICAN AMERICAN): 31 ML/MIN (ref 60–?)
GLUCOSE: 103 MG/DL (ref 74–100)
HCT VFR BLD CALC: 38.3 % (ref 42–52)
HGB BLD-MCNC: 12.5 G/DL (ref 14.1–18)
MCHC RBC-ENTMCNC: 32.8 G/DL (ref 31.8–35.4)
MCV RBC: 92.1 FL (ref 80–94)
MEAN CORPUSCULAR HEMOGLOBIN: 30.2 PG (ref 27–31.2)
PLATELET # BLD: 216 K/MM3 (ref 142–424)
POTASSIUM: 4.8 MMOL/L (ref 3.5–5.1)
RBC # BLD AUTO: 4.15 M/MM3 (ref 4.6–6.2)
SODIUM SPEC-SCNC: 130 MMOL/L (ref 136–145)
WBC # BLD AUTO: 14.8 K/MM3 (ref 4.8–10.8)

## 2023-03-20 PROCEDURE — 85025 COMPLETE CBC W/AUTO DIFF WBC: CPT

## 2023-03-20 PROCEDURE — 86038 ANTINUCLEAR ANTIBODIES: CPT

## 2023-03-20 PROCEDURE — 85651 RBC SED RATE NONAUTOMATED: CPT

## 2023-03-20 PROCEDURE — 86140 C-REACTIVE PROTEIN: CPT

## 2023-03-20 PROCEDURE — 80048 BASIC METABOLIC PNL TOTAL CA: CPT

## 2023-03-23 LAB — ANA SER QL: NEGATIVE

## 2023-05-16 ENCOUNTER — HOSPITAL ENCOUNTER (OUTPATIENT)
Age: 73
End: 2023-05-16
Payer: MEDICARE

## 2023-05-16 DIAGNOSIS — R10.9: Primary | ICD-10-CM

## 2023-05-16 PROCEDURE — 87086 URINE CULTURE/COLONY COUNT: CPT

## 2023-05-17 ENCOUNTER — HOSPITAL ENCOUNTER (OUTPATIENT)
Dept: HOSPITAL 22 - RT | Age: 73
End: 2023-05-17
Payer: MEDICARE

## 2023-05-17 DIAGNOSIS — I73.9: Primary | ICD-10-CM

## 2023-05-17 PROCEDURE — 93930 UPPER EXTREMITY STUDY: CPT

## 2023-07-05 ENCOUNTER — HOSPITAL ENCOUNTER (OUTPATIENT)
Age: 73
End: 2023-07-05
Payer: MEDICARE

## 2023-07-05 DIAGNOSIS — L73.2: Primary | ICD-10-CM

## 2023-07-05 DIAGNOSIS — I50.22: ICD-10-CM

## 2023-07-05 DIAGNOSIS — I25.10: ICD-10-CM

## 2023-07-05 LAB
ALBUMIN LEVEL: 3.7 G/DL (ref 3.5–5)
ALBUMIN/GLOB SERPL: 1.2 {RATIO} (ref 1.1–1.8)
ALP ISO SERPL-ACNC: 95 U/L (ref 38–126)
ALT SERPLBLD-CCNC: 14 U/L (ref 12–78)
ANION GAP SERPL CALC-SCNC: 19.3 MEQ/L (ref 5–15)
AST SERPL QL: 22 U/L (ref 17–59)
BILIRUBIN,TOTAL: 0.4 MG/DL (ref 0.2–1.3)
BUN SERPL-MCNC: 36 MG/DL (ref 9–20)
CALCIUM SPEC-MCNC: 8.8 MG/DL (ref 8.4–10.2)
CELLS COUNTED: 100
CHLORIDE SPEC-SCNC: 100 MMOL/L (ref 98–107)
CHOLEST SPEC-SCNC: 152 MG/DL (ref 140–200)
CO2 SERPL-SCNC: 19 MMOL/L (ref 22–30)
CREAT BLD-SCNC: 2.7 MG/DL (ref 0.66–1.25)
ESTIMATED GLOMERULAR FILT RATE: 23 ML/MIN (ref 60–?)
GFR (AFRICAN AMERICAN): 28 ML/MIN (ref 60–?)
GLOBULIN SER CALC-MCNC: 3.2 G/DL (ref 1.3–3.2)
GLUCOSE: 96 MG/DL (ref 74–100)
HCT VFR BLD CALC: 37.5 % (ref 42–52)
HDLC SERPL-MCNC: 40 MG/DL (ref 40–60)
HGB BLD-MCNC: 11.9 G/DL (ref 14.1–18)
MANUAL DIFFERENTIAL: (no result)
MCHC RBC-ENTMCNC: 31.8 G/DL (ref 31.8–35.4)
MCV RBC: 91.9 FL (ref 80–94)
MEAN CORPUSCULAR HEMOGLOBIN: 29.2 PG (ref 27–31.2)
PLATELET # BLD: 322 K/MM3 (ref 142–424)
POTASSIUM: 5.3 MMOL/L (ref 3.5–5.1)
PROT SERPL-MCNC: 6.9 G/DL (ref 6.3–8.2)
RBC # BLD AUTO: 4.08 M/MM3 (ref 4.6–6.2)
SODIUM SPEC-SCNC: 133 MMOL/L (ref 136–145)
TRIGLYCERIDES: 108 MG/DL (ref 30–150)
TSH SERPL-ACNC: 1.06 UIU/ML (ref 0.47–4.68)
WBC # BLD AUTO: 19 K/MM3 (ref 4.8–10.8)

## 2023-07-05 PROCEDURE — 80061 LIPID PANEL: CPT

## 2023-07-05 PROCEDURE — 85007 BL SMEAR W/DIFF WBC COUNT: CPT

## 2023-07-05 PROCEDURE — 84443 ASSAY THYROID STIM HORMONE: CPT

## 2023-07-05 PROCEDURE — 80053 COMPREHEN METABOLIC PANEL: CPT

## 2023-07-05 PROCEDURE — 85025 COMPLETE CBC W/AUTO DIFF WBC: CPT

## 2023-07-14 ENCOUNTER — HOSPITAL ENCOUNTER (OUTPATIENT)
Age: 73
End: 2023-07-14
Payer: MEDICARE

## 2023-07-14 DIAGNOSIS — D72.829: Primary | ICD-10-CM

## 2023-07-14 DIAGNOSIS — N18.9: ICD-10-CM

## 2023-07-14 LAB
ALBUMIN LEVEL: 3.3 G/DL (ref 3.5–5)
ALBUMIN/GLOB SERPL: 1.1 {RATIO} (ref 1.1–1.8)
ALP ISO SERPL-ACNC: 84 U/L (ref 38–126)
ALT SERPLBLD-CCNC: 14 U/L (ref 12–78)
ANION GAP SERPL CALC-SCNC: 15.1 MEQ/L (ref 5–15)
AST SERPL QL: 21 U/L (ref 17–59)
BILIRUBIN,TOTAL: 0.1 MG/DL (ref 0.2–1.3)
BUN SERPL-MCNC: 44 MG/DL (ref 9–20)
BURR CELLS: (no result)
CALCIUM SPEC-MCNC: 8.4 MG/DL (ref 8.4–10.2)
CELLS COUNTED: 100
CHLORIDE SPEC-SCNC: 94 MMOL/L (ref 98–107)
CO2 SERPL-SCNC: 21 MMOL/L (ref 22–30)
CREAT BLD-SCNC: 2.7 MG/DL (ref 0.66–1.25)
ESTIMATED GLOMERULAR FILT RATE: 23 ML/MIN (ref 60–?)
GFR (AFRICAN AMERICAN): 28 ML/MIN (ref 60–?)
GLOBULIN SER CALC-MCNC: 3 G/DL (ref 1.3–3.2)
GLUCOSE: 87 MG/DL (ref 74–100)
HCT VFR BLD CALC: 33.2 % (ref 42–52)
HGB BLD-MCNC: 10.4 G/DL (ref 14.1–18)
MANUAL DIFFERENTIAL: (no result)
MCHC RBC-ENTMCNC: 31.4 G/DL (ref 31.8–35.4)
MCV RBC: 90 FL (ref 80–94)
MEAN CORPUSCULAR HEMOGLOBIN: 28.2 PG (ref 27–31.2)
PLATELET # BLD: 368 K/MM3 (ref 142–424)
POTASSIUM: 5.1 MMOL/L (ref 3.5–5.1)
PROT SERPL-MCNC: 6.3 G/DL (ref 6.3–8.2)
RBC # BLD AUTO: 3.69 M/MM3 (ref 4.6–6.2)
SODIUM SPEC-SCNC: 125 MMOL/L (ref 136–145)
WBC # BLD AUTO: 21.8 K/MM3 (ref 4.8–10.8)

## 2023-07-14 PROCEDURE — 85007 BL SMEAR W/DIFF WBC COUNT: CPT

## 2023-07-14 PROCEDURE — 80053 COMPREHEN METABOLIC PANEL: CPT

## 2023-07-14 PROCEDURE — 85025 COMPLETE CBC W/AUTO DIFF WBC: CPT

## 2023-07-18 ENCOUNTER — HOSPITAL ENCOUNTER (OUTPATIENT)
Dept: HOSPITAL 22 - ER | Age: 73
Setting detail: OBSERVATION
LOS: 1 days | Discharge: HOME | End: 2023-07-19
Payer: MEDICARE

## 2023-07-18 VITALS
HEART RATE: 65 BPM | DIASTOLIC BLOOD PRESSURE: 71 MMHG | SYSTOLIC BLOOD PRESSURE: 130 MMHG | OXYGEN SATURATION: 97 % | RESPIRATION RATE: 14 BRPM

## 2023-07-18 VITALS — RESPIRATION RATE: 13 BRPM | OXYGEN SATURATION: 98 % | HEART RATE: 61 BPM

## 2023-07-18 VITALS
DIASTOLIC BLOOD PRESSURE: 73 MMHG | RESPIRATION RATE: 16 BRPM | TEMPERATURE: 98.3 F | SYSTOLIC BLOOD PRESSURE: 138 MMHG | HEART RATE: 65 BPM | OXYGEN SATURATION: 97 %

## 2023-07-18 VITALS — BODY MASS INDEX: 20.9 KG/M2 | BODY MASS INDEX: 20.5 KG/M2 | BODY MASS INDEX: 21.2 KG/M2

## 2023-07-18 VITALS
OXYGEN SATURATION: 97 % | DIASTOLIC BLOOD PRESSURE: 73 MMHG | TEMPERATURE: 97.88 F | RESPIRATION RATE: 18 BRPM | SYSTOLIC BLOOD PRESSURE: 124 MMHG | HEART RATE: 74 BPM

## 2023-07-18 VITALS
SYSTOLIC BLOOD PRESSURE: 162 MMHG | OXYGEN SATURATION: 98 % | HEART RATE: 61 BPM | DIASTOLIC BLOOD PRESSURE: 77 MMHG | RESPIRATION RATE: 14 BRPM

## 2023-07-18 VITALS
RESPIRATION RATE: 16 BRPM | DIASTOLIC BLOOD PRESSURE: 76 MMHG | HEART RATE: 67 BPM | OXYGEN SATURATION: 98 % | SYSTOLIC BLOOD PRESSURE: 141 MMHG

## 2023-07-18 VITALS
DIASTOLIC BLOOD PRESSURE: 65 MMHG | HEART RATE: 66 BPM | OXYGEN SATURATION: 99 % | SYSTOLIC BLOOD PRESSURE: 124 MMHG | RESPIRATION RATE: 16 BRPM

## 2023-07-18 VITALS
TEMPERATURE: 97.7 F | OXYGEN SATURATION: 96 % | DIASTOLIC BLOOD PRESSURE: 58 MMHG | SYSTOLIC BLOOD PRESSURE: 102 MMHG | HEART RATE: 71 BPM | RESPIRATION RATE: 20 BRPM

## 2023-07-18 VITALS
OXYGEN SATURATION: 97 % | SYSTOLIC BLOOD PRESSURE: 136 MMHG | DIASTOLIC BLOOD PRESSURE: 70 MMHG | HEART RATE: 64 BPM | RESPIRATION RATE: 11 BRPM

## 2023-07-18 VITALS
RESPIRATION RATE: 18 BRPM | SYSTOLIC BLOOD PRESSURE: 131 MMHG | OXYGEN SATURATION: 98 % | HEART RATE: 72 BPM | TEMPERATURE: 98.24 F | DIASTOLIC BLOOD PRESSURE: 73 MMHG

## 2023-07-18 VITALS
OXYGEN SATURATION: 98 % | SYSTOLIC BLOOD PRESSURE: 132 MMHG | DIASTOLIC BLOOD PRESSURE: 66 MMHG | HEART RATE: 63 BPM | RESPIRATION RATE: 12 BRPM

## 2023-07-18 VITALS
DIASTOLIC BLOOD PRESSURE: 72 MMHG | OXYGEN SATURATION: 97 % | SYSTOLIC BLOOD PRESSURE: 130 MMHG | HEART RATE: 67 BPM | RESPIRATION RATE: 12 BRPM

## 2023-07-18 VITALS — DIASTOLIC BLOOD PRESSURE: 73 MMHG | OXYGEN SATURATION: 99 % | SYSTOLIC BLOOD PRESSURE: 124 MMHG | HEART RATE: 65 BPM

## 2023-07-18 VITALS
OXYGEN SATURATION: 100 % | SYSTOLIC BLOOD PRESSURE: 128 MMHG | TEMPERATURE: 97.88 F | HEART RATE: 61 BPM | DIASTOLIC BLOOD PRESSURE: 65 MMHG | RESPIRATION RATE: 18 BRPM

## 2023-07-18 VITALS — OXYGEN SATURATION: 97 %

## 2023-07-18 DIAGNOSIS — J18.9: Primary | ICD-10-CM

## 2023-07-18 DIAGNOSIS — I25.118: ICD-10-CM

## 2023-07-18 DIAGNOSIS — J20.9: ICD-10-CM

## 2023-07-18 DIAGNOSIS — Z79.899: ICD-10-CM

## 2023-07-18 DIAGNOSIS — N32.0: ICD-10-CM

## 2023-07-18 DIAGNOSIS — F17.210: ICD-10-CM

## 2023-07-18 DIAGNOSIS — R06.9: ICD-10-CM

## 2023-07-18 DIAGNOSIS — J44.0: ICD-10-CM

## 2023-07-18 DIAGNOSIS — I70.213: ICD-10-CM

## 2023-07-18 DIAGNOSIS — N18.4: ICD-10-CM

## 2023-07-18 DIAGNOSIS — E03.9: ICD-10-CM

## 2023-07-18 LAB
ALBUMIN LEVEL: 3.7 G/DL (ref 3.5–5)
ALBUMIN/GLOB SERPL: 1.1 {RATIO} (ref 1.1–1.8)
ALP ISO SERPL-ACNC: 89 U/L (ref 38–126)
ALT SERPLBLD-CCNC: 17 U/L (ref 12–78)
ANION GAP SERPL CALC-SCNC: 12.5 MEQ/L (ref 5–15)
AST SERPL QL: 22 U/L (ref 17–59)
BILIRUBIN,TOTAL: 0.2 MG/DL (ref 0.2–1.3)
BUN SERPL-MCNC: 32 MG/DL (ref 9–20)
CALCIUM SPEC-MCNC: 8.4 MG/DL (ref 8.4–10.2)
CELLS COUNTED: 100
CHLORIDE SPEC-SCNC: 97 MMOL/L (ref 98–107)
CO2 SERPL-SCNC: 24 MMOL/L (ref 22–30)
COLOR UR: YELLOW
CREAT BLD-SCNC: 2.6 MG/DL (ref 0.66–1.25)
CREATININE CLEARANCE ESTIMATED: 25 ML/MIN (ref 50–200)
ESTIMATED GLOMERULAR FILT RATE: 24 ML/MIN (ref 60–?)
GFR (AFRICAN AMERICAN): 30 ML/MIN (ref 60–?)
GLOBULIN SER CALC-MCNC: 3.4 G/DL (ref 1.3–3.2)
GLUCOSE: 99 MG/DL (ref 74–100)
HCO3 BLDV-SCNC: 19.4 MMOL/L (ref 23–30)
HCT VFR BLD CALC: 35.9 % (ref 42–52)
HGB BLD-MCNC: 11.3 G/DL (ref 14.1–18)
LEUKOCYTE ESTERASE UR QL STRIP: (no result)
MANUAL DIFFERENTIAL: (no result)
MCHC RBC-ENTMCNC: 31.6 G/DL (ref 31.8–35.4)
MCV RBC: 90.5 FL (ref 80–94)
MEAN CORPUSCULAR HEMOGLOBIN: 28.6 PG (ref 27–31.2)
MICRO URNS: (no result)
NT PRO BRAIN NATRIURETIC PEP.: 2600 PG/ML (ref 0–125)
PCO2 BLDV: 42.9 MMOL/L (ref 35–51)
PH BLDV: 7.27 MMOL/L (ref 7.31–7.41)
PH UR: 6 [PH] (ref 5–8.5)
PLATELET # BLD: 343 K/MM3 (ref 142–424)
PO2 BLDV: 32.5 MMOL/L (ref 28–40)
POTASSIUM: 4.5 MMOL/L (ref 3.5–5.1)
PROT SERPL-MCNC: 7.1 G/DL (ref 6.3–8.2)
PROT UR STRIP-MCNC: (no result) MG/DL
RBC # BLD AUTO: 3.96 M/MM3 (ref 4.6–6.2)
RBC #/AREA URNS HPF: (no result) #/HPF (ref 0–3)
SODIUM SPEC-SCNC: 129 MMOL/L (ref 136–145)
SP GR UR: 1.01 (ref 1–1.03)
TROPONIN I: < 0.01 NG/ML (ref 0–0.03)
UROBILINOGEN UR QL: 0.2 EU/DL
WBC # BLD AUTO: 23 K/MM3 (ref 4.8–10.8)
WBC # UR: (no result) #/HPF (ref 0–3)

## 2023-07-18 PROCEDURE — 85007 BL SMEAR W/DIFF WBC COUNT: CPT

## 2023-07-18 PROCEDURE — 87636 SARSCOV2 & INF A&B AMP PRB: CPT

## 2023-07-18 PROCEDURE — 80053 COMPREHEN METABOLIC PANEL: CPT

## 2023-07-18 PROCEDURE — 84484 ASSAY OF TROPONIN QUANT: CPT

## 2023-07-18 PROCEDURE — 71250 CT THORAX DX C-: CPT

## 2023-07-18 PROCEDURE — 36415 COLL VENOUS BLD VENIPUNCTURE: CPT

## 2023-07-18 PROCEDURE — 81001 URINALYSIS AUTO W/SCOPE: CPT

## 2023-07-18 PROCEDURE — 87086 URINE CULTURE/COLONY COUNT: CPT

## 2023-07-18 PROCEDURE — 94640 AIRWAY INHALATION TREATMENT: CPT

## 2023-07-18 PROCEDURE — 93005 ELECTROCARDIOGRAM TRACING: CPT

## 2023-07-18 PROCEDURE — 99285 EMERGENCY DEPT VISIT HI MDM: CPT

## 2023-07-18 PROCEDURE — 87040 BLOOD CULTURE FOR BACTERIA: CPT

## 2023-07-18 PROCEDURE — 85025 COMPLETE CBC W/AUTO DIFF WBC: CPT

## 2023-07-18 PROCEDURE — G0378 HOSPITAL OBSERVATION PER HR: HCPCS

## 2023-07-18 PROCEDURE — 71045 X-RAY EXAM CHEST 1 VIEW: CPT

## 2023-07-18 PROCEDURE — 83880 ASSAY OF NATRIURETIC PEPTIDE: CPT

## 2023-07-18 PROCEDURE — 80048 BASIC METABOLIC PNL TOTAL CA: CPT

## 2023-07-18 PROCEDURE — 83605 ASSAY OF LACTIC ACID: CPT

## 2023-07-18 PROCEDURE — 82803 BLOOD GASES ANY COMBINATION: CPT

## 2023-07-19 VITALS
HEART RATE: 66 BPM | TEMPERATURE: 98.24 F | RESPIRATION RATE: 16 BRPM | SYSTOLIC BLOOD PRESSURE: 134 MMHG | DIASTOLIC BLOOD PRESSURE: 72 MMHG | OXYGEN SATURATION: 96 %

## 2023-07-19 VITALS
TEMPERATURE: 98.06 F | DIASTOLIC BLOOD PRESSURE: 66 MMHG | SYSTOLIC BLOOD PRESSURE: 99 MMHG | RESPIRATION RATE: 18 BRPM | HEART RATE: 70 BPM | OXYGEN SATURATION: 97 %

## 2023-07-19 VITALS
TEMPERATURE: 98.78 F | SYSTOLIC BLOOD PRESSURE: 119 MMHG | RESPIRATION RATE: 16 BRPM | OXYGEN SATURATION: 97 % | DIASTOLIC BLOOD PRESSURE: 71 MMHG | HEART RATE: 71 BPM

## 2023-07-19 LAB
ANION GAP SERPL CALC-SCNC: 13 MEQ/L (ref 5–15)
BUN SERPL-MCNC: 40 MG/DL (ref 9–20)
CALCIUM SPEC-MCNC: 8.7 MG/DL (ref 8.4–10.2)
CELLS COUNTED: 100
CHLORIDE SPEC-SCNC: 103 MMOL/L (ref 98–107)
CO2 SERPL-SCNC: 24 MMOL/L (ref 22–30)
CREAT BLD-SCNC: 2.8 MG/DL (ref 0.66–1.25)
CREATININE CLEARANCE ESTIMATED: 23 ML/MIN (ref 50–200)
ESTIMATED GLOMERULAR FILT RATE: 22 ML/MIN (ref 60–?)
GFR (AFRICAN AMERICAN): 27 ML/MIN (ref 60–?)
GLUCOSE: 98 MG/DL (ref 74–100)
HCT VFR BLD CALC: 34.5 % (ref 42–52)
HGB BLD-MCNC: 10.7 G/DL (ref 14.1–18)
MANUAL DIFFERENTIAL: (no result)
MCHC RBC-ENTMCNC: 30.9 G/DL (ref 31.8–35.4)
MCV RBC: 90.5 FL (ref 80–94)
MEAN CORPUSCULAR HEMOGLOBIN: 28 PG (ref 27–31.2)
PLATELET # BLD: 359 K/MM3 (ref 142–424)
POTASSIUM: 5 MMOL/L (ref 3.5–5.1)
RBC # BLD AUTO: 3.81 M/MM3 (ref 4.6–6.2)
SODIUM SPEC-SCNC: 135 MMOL/L (ref 136–145)
WBC # BLD AUTO: 16.6 K/MM3 (ref 4.8–10.8)

## 2023-07-26 ENCOUNTER — HOSPITAL ENCOUNTER (OUTPATIENT)
Age: 73
End: 2023-07-26
Payer: MEDICARE

## 2023-07-26 DIAGNOSIS — I25.10: ICD-10-CM

## 2023-07-26 DIAGNOSIS — I50.22: Primary | ICD-10-CM

## 2023-07-26 DIAGNOSIS — R79.89: ICD-10-CM

## 2023-07-26 DIAGNOSIS — L73.2: ICD-10-CM

## 2023-07-26 LAB
HCT VFR BLD CALC: 32.8 % (ref 42–52)
HGB BLD-MCNC: 10.2 G/DL (ref 14.1–18)
MCHC RBC-ENTMCNC: 31.1 G/DL (ref 31.8–35.4)
MCV RBC: 91.5 FL (ref 80–94)
MEAN CORPUSCULAR HEMOGLOBIN: 28.5 PG (ref 27–31.2)
NT PRO BRAIN NATRIURETIC PEP.: 2910 PG/ML (ref 0–125)
PLATELET # BLD: 294 K/MM3 (ref 142–424)
RBC # BLD AUTO: 3.58 M/MM3 (ref 4.6–6.2)
WBC # BLD AUTO: 14.9 K/MM3 (ref 4.8–10.8)

## 2023-07-26 PROCEDURE — 83880 ASSAY OF NATRIURETIC PEPTIDE: CPT

## 2023-07-26 PROCEDURE — 85025 COMPLETE CBC W/AUTO DIFF WBC: CPT

## 2023-08-02 ENCOUNTER — HOSPITAL ENCOUNTER (OUTPATIENT)
Age: 73
End: 2023-08-02
Payer: MEDICARE

## 2023-08-02 DIAGNOSIS — I25.118: ICD-10-CM

## 2023-08-02 DIAGNOSIS — I73.9: Primary | ICD-10-CM

## 2023-08-02 DIAGNOSIS — I50.22: ICD-10-CM

## 2023-08-02 DIAGNOSIS — R06.09: ICD-10-CM

## 2023-08-02 LAB
ANION GAP SERPL CALC-SCNC: 15.9 MEQ/L (ref 5–15)
BUN SERPL-MCNC: 32 MG/DL (ref 9–20)
CALCIUM SPEC-MCNC: 8.6 MG/DL (ref 8.4–10.2)
CELLS COUNTED: 100
CHLORIDE SPEC-SCNC: 94 MMOL/L (ref 98–107)
CO2 SERPL-SCNC: 22 MMOL/L (ref 22–30)
CREAT BLD-SCNC: 2.6 MG/DL (ref 0.66–1.25)
ESTIMATED GLOMERULAR FILT RATE: 24 ML/MIN (ref 60–?)
GFR (AFRICAN AMERICAN): 30 ML/MIN (ref 60–?)
GLUCOSE: 85 MG/DL (ref 74–100)
HCT VFR BLD CALC: 35.1 % (ref 42–52)
HGB BLD-MCNC: 10.9 G/DL (ref 14.1–18)
MANUAL DIFFERENTIAL: (no result)
MCHC RBC-ENTMCNC: 31.1 G/DL (ref 31.8–35.4)
MCV RBC: 91.3 FL (ref 80–94)
MEAN CORPUSCULAR HEMOGLOBIN: 28.4 PG (ref 27–31.2)
NT PRO BRAIN NATRIURETIC PEP.: 4570 PG/ML (ref 0–125)
PLATELET # BLD: 301 K/MM3 (ref 142–424)
POTASSIUM: 4.9 MMOL/L (ref 3.5–5.1)
RBC # BLD AUTO: 3.84 M/MM3 (ref 4.6–6.2)
SODIUM SPEC-SCNC: 127 MMOL/L (ref 136–145)
WBC # BLD AUTO: 17.7 K/MM3 (ref 4.8–10.8)

## 2023-08-02 PROCEDURE — 80048 BASIC METABOLIC PNL TOTAL CA: CPT

## 2023-08-02 PROCEDURE — 83880 ASSAY OF NATRIURETIC PEPTIDE: CPT

## 2023-08-02 PROCEDURE — 85007 BL SMEAR W/DIFF WBC COUNT: CPT

## 2023-08-02 PROCEDURE — 85025 COMPLETE CBC W/AUTO DIFF WBC: CPT

## 2023-08-07 ENCOUNTER — HOSPITAL ENCOUNTER (OUTPATIENT)
Age: 73
End: 2023-08-07
Payer: MEDICARE

## 2023-08-07 DIAGNOSIS — I25.118: Primary | ICD-10-CM

## 2023-08-07 DIAGNOSIS — I50.22: ICD-10-CM

## 2023-08-07 LAB
ANION GAP SERPL CALC-SCNC: 12.5 MEQ/L (ref 5–15)
BUN SERPL-MCNC: 29 MG/DL (ref 9–20)
CALCIUM SPEC-MCNC: 8.7 MG/DL (ref 8.4–10.2)
CELLS COUNTED: 100
CHLORIDE SPEC-SCNC: 101 MMOL/L (ref 98–107)
CO2 SERPL-SCNC: 22 MMOL/L (ref 22–30)
CREAT BLD-SCNC: 2.6 MG/DL (ref 0.66–1.25)
ESTIMATED GLOMERULAR FILT RATE: 24 ML/MIN (ref 60–?)
GFR (AFRICAN AMERICAN): 30 ML/MIN (ref 60–?)
GLUCOSE: 101 MG/DL (ref 74–100)
HCT VFR BLD CALC: 35.5 % (ref 42–52)
HGB BLD-MCNC: 11.1 G/DL (ref 14.1–18)
MANUAL DIFFERENTIAL: (no result)
MCHC RBC-ENTMCNC: 31.3 G/DL (ref 31.8–35.4)
MCV RBC: 92 FL (ref 80–94)
MEAN CORPUSCULAR HEMOGLOBIN: 28.8 PG (ref 27–31.2)
NT PRO BRAIN NATRIURETIC PEP.: 4340 PG/ML (ref 0–125)
PLATELET # BLD: 310 K/MM3 (ref 142–424)
POTASSIUM: 5.5 MMOL/L (ref 3.5–5.1)
RBC # BLD AUTO: 3.85 M/MM3 (ref 4.6–6.2)
SODIUM SPEC-SCNC: 130 MMOL/L (ref 136–145)
WBC # BLD AUTO: 17.8 K/MM3 (ref 4.8–10.8)

## 2023-08-07 PROCEDURE — 85025 COMPLETE CBC W/AUTO DIFF WBC: CPT

## 2023-08-07 PROCEDURE — 83880 ASSAY OF NATRIURETIC PEPTIDE: CPT

## 2023-08-07 PROCEDURE — 85007 BL SMEAR W/DIFF WBC COUNT: CPT

## 2023-08-07 PROCEDURE — 80048 BASIC METABOLIC PNL TOTAL CA: CPT

## 2023-08-24 ENCOUNTER — HOSPITAL ENCOUNTER (OUTPATIENT)
Dept: HOSPITAL 22 - RAD | Age: 73
End: 2023-08-24
Payer: MEDICARE

## 2023-08-24 DIAGNOSIS — M54.50: Primary | ICD-10-CM

## 2023-08-24 DIAGNOSIS — M79.604: ICD-10-CM

## 2023-08-24 DIAGNOSIS — M79.605: ICD-10-CM

## 2023-08-24 PROCEDURE — 72110 X-RAY EXAM L-2 SPINE 4/>VWS: CPT

## 2023-09-11 ENCOUNTER — HOSPITAL ENCOUNTER (OUTPATIENT)
Age: 73
End: 2023-09-11
Payer: MEDICARE

## 2023-09-11 DIAGNOSIS — R82.90: ICD-10-CM

## 2023-09-11 DIAGNOSIS — L73.2: Primary | ICD-10-CM

## 2023-09-11 PROCEDURE — 87086 URINE CULTURE/COLONY COUNT: CPT

## 2024-01-25 ENCOUNTER — HOSPITAL ENCOUNTER (OUTPATIENT)
Dept: HOSPITAL 22 - RAD | Age: 74
End: 2024-01-25
Payer: MEDICARE

## 2024-01-25 DIAGNOSIS — R07.9: Primary | ICD-10-CM

## 2024-01-25 PROCEDURE — 71046 X-RAY EXAM CHEST 2 VIEWS: CPT

## 2024-02-16 ENCOUNTER — HOSPITAL ENCOUNTER (OUTPATIENT)
Age: 74
End: 2024-02-16
Payer: MEDICARE

## 2024-02-16 DIAGNOSIS — R73.09: ICD-10-CM

## 2024-02-16 DIAGNOSIS — R20.2: Primary | ICD-10-CM

## 2024-02-16 LAB
ALBUMIN LEVEL: 4 G/DL (ref 3.5–5)
ALBUMIN/GLOB SERPL: 1.4 {RATIO} (ref 1.1–1.8)
ALP ISO SERPL-ACNC: 89 U/L (ref 38–126)
ALT SERPLBLD-CCNC: 12 U/L (ref 12–78)
ANION GAP SERPL CALC-SCNC: 13.5 MEQ/L (ref 5–15)
AST SERPL QL: 22 U/L (ref 17–59)
BILIRUBIN,TOTAL: 0.4 MG/DL (ref 0.2–1.3)
BUN SERPL-MCNC: 40 MG/DL (ref 9–20)
CALCIUM SPEC-MCNC: 8.8 MG/DL (ref 8.4–10.2)
CELLS COUNTED: 100
CHLORIDE SPEC-SCNC: 102 MMOL/L (ref 98–107)
CO2 SERPL-SCNC: 23 MMOL/L (ref 22–30)
CREATININE,SERUM: 2.8 MG/DL (ref 0.66–1.25)
ESTIMATED GLOMERULAR FILT RATE: 22 ML/MIN (ref 60–?)
GFR (AFRICAN AMERICAN): 27 ML/MIN (ref 60–?)
GLOBULIN SER CALC-MCNC: 2.8 G/DL (ref 1.3–3.2)
GLUCOSE: 93 MG/DL (ref 74–100)
HBA1C MFR BLD: 5.5 % (ref 4–6)
HCT VFR BLD CALC: 38.1 % (ref 42–52)
HGB BLD-MCNC: 12.7 G/DL (ref 14.1–18)
MANUAL DIFFERENTIAL: (no result)
MCHC RBC-ENTMCNC: 33.3 G/DL (ref 31.8–35.4)
MCV RBC: 93 FL (ref 80–94)
MEAN CORPUSCULAR HEMOGLOBIN: 31 PG (ref 27–31.2)
PLATELET # BLD: 215 K/MM3 (ref 142–424)
POTASSIUM: 5.5 MMOL/L (ref 3.5–5.1)
PROT SERPL-MCNC: 6.8 G/DL (ref 6.3–8.2)
RBC # BLD AUTO: 4.09 M/MM3 (ref 4.6–6.2)
SODIUM SPEC-SCNC: 133 MMOL/L (ref 136–145)
VITAMIN B12: 622 PG/ML (ref 239–931)
WBC # BLD AUTO: 15.6 K/MM3 (ref 4.8–10.8)

## 2024-02-16 PROCEDURE — 83036 HEMOGLOBIN GLYCOSYLATED A1C: CPT

## 2024-02-16 PROCEDURE — 80053 COMPREHEN METABOLIC PANEL: CPT

## 2024-02-16 PROCEDURE — 85007 BL SMEAR W/DIFF WBC COUNT: CPT

## 2024-02-16 PROCEDURE — 82607 VITAMIN B-12: CPT

## 2024-02-16 PROCEDURE — 85025 COMPLETE CBC W/AUTO DIFF WBC: CPT

## 2024-02-26 ENCOUNTER — HOSPITAL ENCOUNTER (OUTPATIENT)
Age: 74
End: 2024-02-26
Payer: MEDICARE

## 2024-02-26 DIAGNOSIS — M79.671: Primary | ICD-10-CM

## 2024-02-26 DIAGNOSIS — M79.672: ICD-10-CM

## 2024-02-26 PROCEDURE — 73630 X-RAY EXAM OF FOOT: CPT

## 2024-03-04 ENCOUNTER — HOSPITAL ENCOUNTER (OUTPATIENT)
Age: 74
End: 2024-03-04
Payer: MEDICARE

## 2024-03-04 DIAGNOSIS — R09.89: Primary | ICD-10-CM

## 2024-03-04 DIAGNOSIS — I73.9: ICD-10-CM

## 2024-03-04 PROCEDURE — 93923 UPR/LXTR ART STDY 3+ LVLS: CPT

## 2024-06-18 ENCOUNTER — HOSPITAL ENCOUNTER (OUTPATIENT)
Dept: HOSPITAL 22 - LAB | Age: 74
Discharge: HOME | End: 2024-06-18
Payer: MEDICARE

## 2024-06-18 DIAGNOSIS — D64.9: ICD-10-CM

## 2024-06-18 DIAGNOSIS — N18.4: ICD-10-CM

## 2024-06-18 DIAGNOSIS — Z12.5: Primary | ICD-10-CM

## 2024-06-18 DIAGNOSIS — R53.83: ICD-10-CM

## 2024-06-18 DIAGNOSIS — Z79.01: ICD-10-CM

## 2024-06-18 DIAGNOSIS — R58: ICD-10-CM

## 2024-06-18 LAB
ALBUMIN LEVEL: 3.9 G/DL (ref 3.5–5)
ALBUMIN/GLOB SERPL: 1.4 {RATIO} (ref 1.1–1.8)
ALP ISO SERPL-ACNC: 84 U/L (ref 38–126)
ALT SERPLBLD-CCNC: 12 U/L (ref 12–78)
ANION GAP SERPL CALC-SCNC: 18.8 MEQ/L (ref 5–15)
AST SERPL QL: 21 U/L (ref 17–59)
BILIRUBIN,TOTAL: 0.6 MG/DL (ref 0.2–1.3)
BUN SERPL-MCNC: 53 MG/DL (ref 9–20)
CALCIUM SPEC-MCNC: 8.8 MG/DL (ref 8.4–10.2)
CELLS COUNTED: 100
CHLORIDE SPEC-SCNC: 104 MMOL/L (ref 98–107)
CO2 SERPL-SCNC: 16 MMOL/L (ref 22–30)
CREAT BLD-SCNC: 3.9 MG/DL (ref 0.66–1.25)
CRP SERPL HS-MCNC: 6.3 MG/L (ref 0–4)
ESTIMATED GLOMERULAR FILT RATE: 15 ML/MIN (ref 60–?)
FT4I SERPL CALC-MCNC: 3.5 UG/DL (ref 5.93–13.13)
GFR (AFRICAN AMERICAN): 18 ML/MIN (ref 60–?)
GLOBULIN SER CALC-MCNC: 2.8 G/DL (ref 1.3–3.2)
GLUCOSE: 87 MG/DL (ref 74–100)
HCT VFR BLD CALC: 35 % (ref 42–52)
HGB BLD-MCNC: 11.5 G/DL (ref 14.1–18)
INR PPP: 1.07 (ref 0.9–1.1)
IRON SERPL QL: 71 UG/DL (ref 49–181)
MANUAL DIFFERENTIAL: (no result)
MCHC RBC-ENTMCNC: 32.8 G/DL (ref 31.8–35.4)
MCV RBC: 93.2 FL (ref 80–94)
MEAN CORPUSCULAR HEMOGLOBIN: 30.5 PG (ref 27–31.2)
PLATELET # BLD: 245 K/MM3 (ref 142–424)
POTASSIUM: 4.8 MMOL/L (ref 3.5–5.1)
PROT SERPL-MCNC: 6.7 G/DL (ref 6.3–8.2)
PT BLD: 11.5 SECONDS (ref 10.1–12.5)
RBC # BLD AUTO: 3.75 M/MM3 (ref 4.6–6.2)
RETICS/RBC NFR AUTO: 1 % (ref 0.9–3.2)
SODIUM SPEC-SCNC: 134 MMOL/L (ref 136–145)
T3RU NFR SERPL: 42 % (ref 23.5–40.5)
T4 (THYROXINE): 8.4 UG/DL (ref 5.53–11)
TOTAL IRON BINDING CAPACITY: 277 UG/DL (ref 261–462)
TSH SERPL-ACNC: 1.69 UIU/ML (ref 0.47–4.68)
VITAMIN B12: 761 PG/ML (ref 239–931)
WBC # BLD AUTO: 18 K/MM3 (ref 4.8–10.8)

## 2024-06-18 PROCEDURE — G0103 PSA SCREENING: HCPCS

## 2024-06-18 PROCEDURE — 80053 COMPREHEN METABOLIC PANEL: CPT

## 2024-06-18 PROCEDURE — 85651 RBC SED RATE NONAUTOMATED: CPT

## 2024-06-18 PROCEDURE — 86140 C-REACTIVE PROTEIN: CPT

## 2024-06-18 PROCEDURE — 84479 ASSAY OF THYROID (T3 OR T4): CPT

## 2024-06-18 PROCEDURE — 82607 VITAMIN B-12: CPT

## 2024-06-18 PROCEDURE — 85025 COMPLETE CBC W/AUTO DIFF WBC: CPT

## 2024-06-18 PROCEDURE — 85007 BL SMEAR W/DIFF WBC COUNT: CPT

## 2024-06-18 PROCEDURE — 83550 IRON BINDING TEST: CPT

## 2024-06-18 PROCEDURE — 84436 ASSAY OF TOTAL THYROXINE: CPT

## 2024-06-18 PROCEDURE — 83540 ASSAY OF IRON: CPT

## 2024-06-18 PROCEDURE — 85044 MANUAL RETICULOCYTE COUNT: CPT

## 2024-06-18 PROCEDURE — 36415 COLL VENOUS BLD VENIPUNCTURE: CPT

## 2024-06-18 PROCEDURE — 84443 ASSAY THYROID STIM HORMONE: CPT

## 2024-06-18 PROCEDURE — 82668 ASSAY OF ERYTHROPOIETIN: CPT

## 2024-06-18 PROCEDURE — 85610 PROTHROMBIN TIME: CPT

## 2024-06-18 PROCEDURE — 85027 COMPLETE CBC AUTOMATED: CPT

## 2024-06-18 PROCEDURE — 84403 ASSAY OF TOTAL TESTOSTERONE: CPT

## 2024-06-19 LAB — TESTOSTERONE,TOTAL: 456 NG/DL (ref 264–916)

## 2024-07-08 ENCOUNTER — HOSPITAL ENCOUNTER (OUTPATIENT)
Dept: HOSPITAL 22 - ER | Age: 74
Setting detail: OBSERVATION
LOS: 1 days | Discharge: ANOTHER HOSPITAL | End: 2024-07-09
Payer: MEDICARE

## 2024-07-08 VITALS
DIASTOLIC BLOOD PRESSURE: 58 MMHG | SYSTOLIC BLOOD PRESSURE: 118 MMHG | HEART RATE: 71 BPM | OXYGEN SATURATION: 95 % | RESPIRATION RATE: 17 BRPM

## 2024-07-08 VITALS
RESPIRATION RATE: 18 BRPM | HEART RATE: 70 BPM | DIASTOLIC BLOOD PRESSURE: 60 MMHG | SYSTOLIC BLOOD PRESSURE: 112 MMHG | OXYGEN SATURATION: 93 %

## 2024-07-08 VITALS
OXYGEN SATURATION: 96 % | DIASTOLIC BLOOD PRESSURE: 64 MMHG | RESPIRATION RATE: 18 BRPM | HEART RATE: 78 BPM | SYSTOLIC BLOOD PRESSURE: 115 MMHG

## 2024-07-08 VITALS — SYSTOLIC BLOOD PRESSURE: 123 MMHG | DIASTOLIC BLOOD PRESSURE: 56 MMHG | HEART RATE: 82 BPM | OXYGEN SATURATION: 92 %

## 2024-07-08 VITALS
RESPIRATION RATE: 18 BRPM | OXYGEN SATURATION: 97 % | DIASTOLIC BLOOD PRESSURE: 64 MMHG | HEART RATE: 67 BPM | SYSTOLIC BLOOD PRESSURE: 130 MMHG

## 2024-07-08 VITALS — DIASTOLIC BLOOD PRESSURE: 72 MMHG | HEART RATE: 81 BPM | SYSTOLIC BLOOD PRESSURE: 144 MMHG | OXYGEN SATURATION: 94 %

## 2024-07-08 VITALS
TEMPERATURE: 99.14 F | OXYGEN SATURATION: 96 % | HEART RATE: 92 BPM | SYSTOLIC BLOOD PRESSURE: 144 MMHG | RESPIRATION RATE: 18 BRPM | DIASTOLIC BLOOD PRESSURE: 72 MMHG

## 2024-07-08 VITALS
OXYGEN SATURATION: 97 % | HEART RATE: 74 BPM | SYSTOLIC BLOOD PRESSURE: 143 MMHG | RESPIRATION RATE: 16 BRPM | DIASTOLIC BLOOD PRESSURE: 74 MMHG

## 2024-07-08 VITALS
OXYGEN SATURATION: 95 % | SYSTOLIC BLOOD PRESSURE: 118 MMHG | DIASTOLIC BLOOD PRESSURE: 61 MMHG | RESPIRATION RATE: 18 BRPM | HEART RATE: 73 BPM

## 2024-07-08 VITALS — SYSTOLIC BLOOD PRESSURE: 120 MMHG | DIASTOLIC BLOOD PRESSURE: 73 MMHG | OXYGEN SATURATION: 95 % | HEART RATE: 88 BPM

## 2024-07-08 VITALS
HEART RATE: 60 BPM | OXYGEN SATURATION: 95 % | SYSTOLIC BLOOD PRESSURE: 98 MMHG | RESPIRATION RATE: 16 BRPM | DIASTOLIC BLOOD PRESSURE: 57 MMHG

## 2024-07-08 VITALS — BODY MASS INDEX: 21.7 KG/M2

## 2024-07-08 VITALS — TEMPERATURE: 103.46 F

## 2024-07-08 VITALS — TEMPERATURE: 101.48 F

## 2024-07-08 DIAGNOSIS — E87.1: Primary | ICD-10-CM

## 2024-07-08 DIAGNOSIS — I50.22: ICD-10-CM

## 2024-07-08 DIAGNOSIS — Z79.899: ICD-10-CM

## 2024-07-08 DIAGNOSIS — N39.0: ICD-10-CM

## 2024-07-08 DIAGNOSIS — N31.9: ICD-10-CM

## 2024-07-08 DIAGNOSIS — D72.829: ICD-10-CM

## 2024-07-08 DIAGNOSIS — N13.30: ICD-10-CM

## 2024-07-08 LAB
ALBUMIN LEVEL: 3.7 G/DL (ref 3.5–5)
ALBUMIN/GLOB SERPL: 1.1 {RATIO} (ref 1.1–1.8)
ALP ISO SERPL-ACNC: 74 U/L (ref 38–126)
ALT SERPLBLD-CCNC: 15 U/L (ref 12–78)
ANION GAP SERPL CALC-SCNC: 15.2 MEQ/L (ref 5–15)
AST SERPL QL: 27 U/L (ref 17–59)
BACTERIA URNS QL MICRO: (no result) /LPF
BILIRUBIN,TOTAL: 0.6 MG/DL (ref 0.2–1.3)
BUN SERPL-MCNC: 66 MG/DL (ref 9–20)
CALCIUM SPEC-MCNC: 8.9 MG/DL (ref 8.4–10.2)
CELLS COUNTED: 100
CHLORIDE SPEC-SCNC: 100 MMOL/L (ref 98–107)
CO2 SERPL-SCNC: 17 MMOL/L (ref 22–30)
COLOR UR: YELLOW
CREAT BLD-SCNC: 4.3 MG/DL (ref 0.66–1.25)
CREATININE CLEARANCE ESTIMATED: 16 ML/MIN (ref 50–200)
ESTIMATED GLOMERULAR FILT RATE: 14 ML/MIN (ref 60–?)
GFR (AFRICAN AMERICAN): 16 ML/MIN (ref 60–?)
GLOBULIN SER CALC-MCNC: 3.4 G/DL (ref 1.3–3.2)
GLUCOSE: 101 MG/DL (ref 74–100)
HCT VFR BLD CALC: 34 % (ref 42–52)
HGB BLD-MCNC: 11.3 G/DL (ref 14.1–18)
LEUKOCYTE ESTERASE UR QL STRIP: (no result)
LIPASE: 104 U/L (ref 23–300)
MANUAL DIFFERENTIAL: (no result)
MCHC RBC-ENTMCNC: 33.3 G/DL (ref 31.8–35.4)
MCV RBC: 92.9 FL (ref 80–94)
MEAN CORPUSCULAR HEMOGLOBIN: 30.9 PG (ref 27–31.2)
MICRO URNS: (no result)
PH UR: 6 [PH] (ref 5–8.5)
PLATELET # BLD: 189 K/MM3 (ref 142–424)
POTASSIUM: 4.2 MMOL/L (ref 3.5–5.1)
PROT SERPL-MCNC: 7.1 G/DL (ref 6.3–8.2)
PROT UR STRIP-MCNC: (no result) MG/DL
RBC # BLD AUTO: 3.66 M/MM3 (ref 4.6–6.2)
SODIUM SPEC-SCNC: 128 MMOL/L (ref 136–145)
SP GR UR: <= 1.005 (ref 1–1.03)
UROBILINOGEN UR QL: 0.2 EU/DL
WBC # BLD AUTO: 19 K/MM3 (ref 4.8–10.8)
WBC # UR: (no result) #/HPF (ref 0–3)

## 2024-07-08 PROCEDURE — 71045 X-RAY EXAM CHEST 1 VIEW: CPT

## 2024-07-08 PROCEDURE — 87088 URINE BACTERIA CULTURE: CPT

## 2024-07-08 PROCEDURE — 85007 BL SMEAR W/DIFF WBC COUNT: CPT

## 2024-07-08 PROCEDURE — 74177 CT ABD & PELVIS W/CONTRAST: CPT

## 2024-07-08 PROCEDURE — 87636 SARSCOV2 & INF A&B AMP PRB: CPT

## 2024-07-08 PROCEDURE — 87086 URINE CULTURE/COLONY COUNT: CPT

## 2024-07-08 PROCEDURE — 81001 URINALYSIS AUTO W/SCOPE: CPT

## 2024-07-08 PROCEDURE — 94640 AIRWAY INHALATION TREATMENT: CPT

## 2024-07-08 PROCEDURE — 85027 COMPLETE CBC AUTOMATED: CPT

## 2024-07-08 PROCEDURE — 99291 CRITICAL CARE FIRST HOUR: CPT

## 2024-07-08 PROCEDURE — 87040 BLOOD CULTURE FOR BACTERIA: CPT

## 2024-07-08 PROCEDURE — 85025 COMPLETE CBC W/AUTO DIFF WBC: CPT

## 2024-07-08 PROCEDURE — 83735 ASSAY OF MAGNESIUM: CPT

## 2024-07-08 PROCEDURE — 87186 SC STD MICRODIL/AGAR DIL: CPT

## 2024-07-08 PROCEDURE — 84100 ASSAY OF PHOSPHORUS: CPT

## 2024-07-08 PROCEDURE — G0378 HOSPITAL OBSERVATION PER HR: HCPCS

## 2024-07-08 PROCEDURE — 51702 INSERT TEMP BLADDER CATH: CPT

## 2024-07-08 PROCEDURE — 80053 COMPREHEN METABOLIC PANEL: CPT

## 2024-07-08 PROCEDURE — 83690 ASSAY OF LIPASE: CPT

## 2024-07-08 RX ADMIN — VANCOMYCIN 150 GM: 1.5 INJECTION, SOLUTION INTRAVENOUS at 19:40

## 2024-07-08 RX ADMIN — IOPAMIDOL 75 ML: 755 INJECTION, SOLUTION INTRAVENOUS at 19:31

## 2024-07-08 RX ADMIN — PIPERACILLIN AND TAZOBACTAM 100 GM: 3; .375 INJECTION, POWDER, LYOPHILIZED, FOR SOLUTION INTRAVENOUS; PARENTERAL at 19:26

## 2024-07-08 RX ADMIN — SODIUM CHLORIDE, SODIUM LACTATE, POTASSIUM CHLORIDE, AND CALCIUM CHLORIDE 999 ML: 600; 310; 30; 20 INJECTION, SOLUTION INTRAVENOUS at 18:38

## 2024-07-08 RX ADMIN — SODIUM CHLORIDE, SODIUM LACTATE, POTASSIUM CHLORIDE, AND CALCIUM CHLORIDE 999 ML: 600; 310; 30; 20 INJECTION, SOLUTION INTRAVENOUS at 19:26

## 2024-07-08 RX ADMIN — SODIUM CHLORIDE, PRESERVATIVE FREE 10 ML: 5 INJECTION INTRAVENOUS at 19:31

## 2024-07-08 RX ADMIN — ACETAMINOPHEN 1000 MG: 10 INJECTION, SOLUTION INTRAVENOUS at 18:38

## 2024-07-09 VITALS
DIASTOLIC BLOOD PRESSURE: 65 MMHG | RESPIRATION RATE: 16 BRPM | HEART RATE: 88 BPM | OXYGEN SATURATION: 94 % | SYSTOLIC BLOOD PRESSURE: 123 MMHG

## 2024-07-09 VITALS
HEART RATE: 105 BPM | OXYGEN SATURATION: 96 % | RESPIRATION RATE: 16 BRPM | DIASTOLIC BLOOD PRESSURE: 69 MMHG | SYSTOLIC BLOOD PRESSURE: 143 MMHG

## 2024-07-09 VITALS
DIASTOLIC BLOOD PRESSURE: 65 MMHG | HEART RATE: 86 BPM | RESPIRATION RATE: 16 BRPM | OXYGEN SATURATION: 95 % | SYSTOLIC BLOOD PRESSURE: 114 MMHG

## 2024-07-09 VITALS
SYSTOLIC BLOOD PRESSURE: 101 MMHG | RESPIRATION RATE: 14 BRPM | HEART RATE: 76 BPM | OXYGEN SATURATION: 97 % | TEMPERATURE: 99.14 F | DIASTOLIC BLOOD PRESSURE: 52 MMHG

## 2024-07-09 VITALS
SYSTOLIC BLOOD PRESSURE: 101 MMHG | HEART RATE: 79 BPM | DIASTOLIC BLOOD PRESSURE: 52 MMHG | RESPIRATION RATE: 14 BRPM | OXYGEN SATURATION: 97 %

## 2024-07-09 VITALS
OXYGEN SATURATION: 92 % | RESPIRATION RATE: 18 BRPM | DIASTOLIC BLOOD PRESSURE: 74 MMHG | HEART RATE: 95 BPM | SYSTOLIC BLOOD PRESSURE: 137 MMHG

## 2024-07-09 VITALS
DIASTOLIC BLOOD PRESSURE: 56 MMHG | RESPIRATION RATE: 18 BRPM | OXYGEN SATURATION: 96 % | SYSTOLIC BLOOD PRESSURE: 102 MMHG | HEART RATE: 81 BPM

## 2024-07-09 VITALS
RESPIRATION RATE: 18 BRPM | SYSTOLIC BLOOD PRESSURE: 118 MMHG | OXYGEN SATURATION: 94 % | HEART RATE: 85 BPM | DIASTOLIC BLOOD PRESSURE: 58 MMHG

## 2024-07-09 VITALS
DIASTOLIC BLOOD PRESSURE: 56 MMHG | HEART RATE: 80 BPM | OXYGEN SATURATION: 97 % | SYSTOLIC BLOOD PRESSURE: 102 MMHG | RESPIRATION RATE: 15 BRPM

## 2024-07-09 VITALS — HEART RATE: 86 BPM

## 2024-07-09 LAB
ALBUMIN LEVEL: 3.8 G/DL (ref 3.5–5)
ALBUMIN/GLOB SERPL: 1.2 {RATIO} (ref 1.1–1.8)
ALP ISO SERPL-ACNC: 79 U/L (ref 38–126)
ALT SERPLBLD-CCNC: 19 U/L (ref 12–78)
ANION GAP SERPL CALC-SCNC: 16.2 MEQ/L (ref 5–15)
AST SERPL QL: 39 U/L (ref 17–59)
BILIRUBIN,TOTAL: 0.6 MG/DL (ref 0.2–1.3)
BUN SERPL-MCNC: 63 MG/DL (ref 9–20)
CALCIUM SPEC-MCNC: 9.1 MG/DL (ref 8.4–10.2)
CHLORIDE SPEC-SCNC: 103 MMOL/L (ref 98–107)
CO2 SERPL-SCNC: 18 MMOL/L (ref 22–30)
CREAT BLD-SCNC: 4.3 MG/DL (ref 0.66–1.25)
CREATININE CLEARANCE ESTIMATED: 16 ML/MIN (ref 50–200)
ESTIMATED GLOMERULAR FILT RATE: 14 ML/MIN (ref 60–?)
GFR (AFRICAN AMERICAN): 16 ML/MIN (ref 60–?)
GLOBULIN SER CALC-MCNC: 3.3 G/DL (ref 1.3–3.2)
GLUCOSE: 100 MG/DL (ref 74–100)
MAGNESIUM: 1.4 MG/DL (ref 1.6–2.3)
PHOSPHOROUS: 5.9 MG/DL (ref 2.5–4.5)
POTASSIUM: 5.2 MMOL/L (ref 3.5–5.1)
PROT SERPL-MCNC: 7.1 G/DL (ref 6.3–8.2)
SODIUM SPEC-SCNC: 132 MMOL/L (ref 136–145)

## 2024-07-09 RX ADMIN — IPRATROPIUM BROMIDE AND ALBUTEROL SULFATE 3 ML: .5; 3 SOLUTION RESPIRATORY (INHALATION) at 01:50

## 2024-07-09 RX ADMIN — SODIUM CHLORIDE, SODIUM LACTATE, POTASSIUM CHLORIDE, AND CALCIUM CHLORIDE 999 ML: 600; 310; 30; 20 INJECTION, SOLUTION INTRAVENOUS at 00:18

## 2024-07-09 RX ADMIN — ACETAMINOPHEN 1000 MG: 10 INJECTION, SOLUTION INTRAVENOUS at 00:18

## 2024-07-18 ENCOUNTER — HOSPITAL ENCOUNTER (OUTPATIENT)
Dept: HOSPITAL 22 - RT | Age: 74
Discharge: HOME | End: 2024-07-18
Payer: MEDICARE

## 2024-07-18 DIAGNOSIS — M79.604: Primary | ICD-10-CM

## 2024-07-18 PROCEDURE — 93971 EXTREMITY STUDY: CPT

## 2024-10-01 ENCOUNTER — HOSPITAL ENCOUNTER (EMERGENCY)
Age: 74
LOS: 1 days | Discharge: ANOTHER HOSPITAL | End: 2024-10-02
Payer: MEDICARE

## 2024-10-01 VITALS
DIASTOLIC BLOOD PRESSURE: 60 MMHG | RESPIRATION RATE: 20 BRPM | BODY MASS INDEX: 19 KG/M2 | OXYGEN SATURATION: 100 % | HEART RATE: 73 BPM | SYSTOLIC BLOOD PRESSURE: 91 MMHG | TEMPERATURE: 97.9 F

## 2024-10-01 VITALS
OXYGEN SATURATION: 97 % | HEART RATE: 67 BPM | DIASTOLIC BLOOD PRESSURE: 61 MMHG | RESPIRATION RATE: 11 BRPM | SYSTOLIC BLOOD PRESSURE: 93 MMHG

## 2024-10-01 VITALS
HEART RATE: 67 BPM | DIASTOLIC BLOOD PRESSURE: 60 MMHG | OXYGEN SATURATION: 97 % | SYSTOLIC BLOOD PRESSURE: 97 MMHG | RESPIRATION RATE: 11 BRPM

## 2024-10-01 VITALS
DIASTOLIC BLOOD PRESSURE: 60 MMHG | RESPIRATION RATE: 18 BRPM | OXYGEN SATURATION: 79 % | SYSTOLIC BLOOD PRESSURE: 91 MMHG | HEART RATE: 75 BPM

## 2024-10-01 VITALS
DIASTOLIC BLOOD PRESSURE: 53 MMHG | SYSTOLIC BLOOD PRESSURE: 80 MMHG | RESPIRATION RATE: 11 BRPM | HEART RATE: 58 BPM | OXYGEN SATURATION: 99 %

## 2024-10-01 VITALS
RESPIRATION RATE: 15 BRPM | DIASTOLIC BLOOD PRESSURE: 58 MMHG | SYSTOLIC BLOOD PRESSURE: 92 MMHG | HEART RATE: 54 BPM | OXYGEN SATURATION: 93 %

## 2024-10-01 DIAGNOSIS — A41.9: Primary | ICD-10-CM

## 2024-10-01 DIAGNOSIS — N17.9: ICD-10-CM

## 2024-10-01 DIAGNOSIS — N13.0: ICD-10-CM

## 2024-10-01 DIAGNOSIS — R65.21: ICD-10-CM

## 2024-10-01 LAB
ALBUMIN LEVEL: 3.6 G/DL (ref 3.5–5)
ALBUMIN/GLOB SERPL: 0.9 {RATIO} (ref 1.1–1.8)
ALP ISO SERPL-ACNC: 146 U/L (ref 38–126)
ALT SERPLBLD-CCNC: 34 U/L (ref 12–78)
ANION GAP SERPL CALC-SCNC: 25 MEQ/L (ref 5–15)
ANISOCYTOSIS BLD QL: (no result)
APTT PPP: 38.5 SECONDS (ref 22.8–30.6)
AST SERPL QL: 44 U/L (ref 17–59)
BACTERIA URNS QL MICRO: (no result) /LPF
BILIRUBIN,TOTAL: 1.3 MG/DL (ref 0.2–1.3)
BUN SERPL-MCNC: 150 MG/DL (ref 9–20)
CALCIUM SPEC-MCNC: 8.6 MG/DL (ref 8.4–10.2)
CELLS COUNTED: 100
CHLORIDE SPEC-SCNC: 100 MMOL/L (ref 98–107)
CHOLEST SPEC-SCNC: 107 MG/DL (ref 140–200)
CO2 SERPL-SCNC: 10 MMOL/L (ref 22–30)
COLOR UR: YELLOW
CREAT BLD-SCNC: 8.2 MG/DL (ref 0.66–1.25)
CREATININE CLEARANCE ESTIMATED: 7 ML/MIN (ref 50–200)
ESTIMATED GLOMERULAR FILT RATE: 6 ML/MIN (ref 60–?)
GFR (AFRICAN AMERICAN): 8 ML/MIN (ref 60–?)
GLOBULIN SER CALC-MCNC: 4.2 G/DL (ref 1.3–3.2)
GLUCOSE: 134 MG/DL (ref 74–100)
HBA1C MFR BLD: 6.1 % (ref 4–6)
HCO3 BLDV-SCNC: 11 MMOL/L (ref 23–30)
HCT VFR BLD CALC: 33.3 % (ref 42–52)
HDLC SERPL-MCNC: 24 MG/DL (ref 40–60)
HGB BLD-MCNC: 10.7 G/DL (ref 14.1–18)
HYPOCHROMIA BLD QL: (no result)
INR PPP: 1.07 (ref 0.9–1.1)
LACTATE VENOUS: 2.4 MMOL/L (ref 0.4–2)
LEUKOCYTE ESTERASE UR QL STRIP: (no result)
LIPASE: 249 U/L (ref 23–300)
MAGNESIUM: 1.7 MG/DL (ref 1.6–2.3)
MANUAL DIFFERENTIAL: (no result)
MCHC RBC-ENTMCNC: 32.2 G/DL (ref 31.8–35.4)
MCV RBC: 93.2 FL (ref 80–94)
MEAN CORPUSCULAR HEMOGLOBIN: 30 PG (ref 27–31.2)
MICRO URNS: (no result)
NT PRO BRAIN NATRIURETIC PEP.: 9990 PG/ML (ref 0–125)
PCO2 BLDV: 29.4 MMOL/L (ref 35–51)
PH BLDV: 7.19 MMOL/L (ref 7.31–7.41)
PH UR: 6 [PH] (ref 5–8.5)
PLATELET # BLD: 366 K/MM3 (ref 142–424)
PO2 BLDV: 37.5 MMOL/L (ref 28–40)
POTASSIUM: 5 MMOL/L (ref 3.5–5.1)
PROCALCITONIN: 1.77 NG/ML (ref 0–2)
PROT SERPL-MCNC: 7.8 G/DL (ref 6.3–8.2)
PROT UR STRIP-MCNC: (no result) MG/DL
PT BLD: 11.9 SECONDS (ref 10.1–12.5)
RBC # BLD AUTO: 3.57 M/MM3 (ref 4.6–6.2)
RBC #/AREA URNS HPF: (no result) #/HPF (ref 0–3)
ROULEAUX BLD QL SMEAR: (no result)
SODIUM SPEC-SCNC: 130 MMOL/L (ref 136–145)
SP GR UR: 1.01 (ref 1–1.03)
SQUAMOUS URNS QL MICRO: (no result) #/HPF (ref 0–5)
TRIGLYCERIDES: 141 MG/DL (ref 30–150)
TROPONIN I: 0.03 NG/ML (ref 0–0.03)
UROBILINOGEN UR QL: 0.2 EU/DL
WBC # BLD AUTO: 60 K/MM3 (ref 4.8–10.8)
WBC # UR: (no result) #/HPF (ref 0–3)

## 2024-10-01 PROCEDURE — 87088 URINE BACTERIA CULTURE: CPT

## 2024-10-01 PROCEDURE — 87186 SC STD MICRODIL/AGAR DIL: CPT

## 2024-10-01 PROCEDURE — 85027 COMPLETE CBC AUTOMATED: CPT

## 2024-10-01 PROCEDURE — 71045 X-RAY EXAM CHEST 1 VIEW: CPT

## 2024-10-01 PROCEDURE — 87086 URINE CULTURE/COLONY COUNT: CPT

## 2024-10-01 PROCEDURE — 83690 ASSAY OF LIPASE: CPT

## 2024-10-01 PROCEDURE — 81001 URINALYSIS AUTO W/SCOPE: CPT

## 2024-10-01 PROCEDURE — 85025 COMPLETE CBC W/AUTO DIFF WBC: CPT

## 2024-10-01 PROCEDURE — 82803 BLOOD GASES ANY COMBINATION: CPT

## 2024-10-01 PROCEDURE — 83880 ASSAY OF NATRIURETIC PEPTIDE: CPT

## 2024-10-01 PROCEDURE — 99291 CRITICAL CARE FIRST HOUR: CPT

## 2024-10-01 PROCEDURE — 85610 PROTHROMBIN TIME: CPT

## 2024-10-01 PROCEDURE — 83036 HEMOGLOBIN GLYCOSYLATED A1C: CPT

## 2024-10-01 PROCEDURE — 84484 ASSAY OF TROPONIN QUANT: CPT

## 2024-10-01 PROCEDURE — 85007 BL SMEAR W/DIFF WBC COUNT: CPT

## 2024-10-01 PROCEDURE — 84145 PROCALCITONIN (PCT): CPT

## 2024-10-01 PROCEDURE — 80053 COMPREHEN METABOLIC PANEL: CPT

## 2024-10-01 PROCEDURE — 80061 LIPID PANEL: CPT

## 2024-10-01 PROCEDURE — 93005 ELECTROCARDIOGRAM TRACING: CPT

## 2024-10-01 PROCEDURE — 83605 ASSAY OF LACTIC ACID: CPT

## 2024-10-01 PROCEDURE — 96361 HYDRATE IV INFUSION ADD-ON: CPT

## 2024-10-01 PROCEDURE — 83735 ASSAY OF MAGNESIUM: CPT

## 2024-10-01 PROCEDURE — 96375 TX/PRO/DX INJ NEW DRUG ADDON: CPT

## 2024-10-01 PROCEDURE — 96374 THER/PROPH/DIAG INJ IV PUSH: CPT

## 2024-10-01 PROCEDURE — 85730 THROMBOPLASTIN TIME PARTIAL: CPT

## 2024-10-01 PROCEDURE — 87040 BLOOD CULTURE FOR BACTERIA: CPT

## 2024-10-01 PROCEDURE — 51702 INSERT TEMP BLADDER CATH: CPT

## 2024-10-02 VITALS — RESPIRATION RATE: 13 BRPM | DIASTOLIC BLOOD PRESSURE: 64 MMHG | SYSTOLIC BLOOD PRESSURE: 96 MMHG

## 2024-10-02 VITALS
DIASTOLIC BLOOD PRESSURE: 66 MMHG | SYSTOLIC BLOOD PRESSURE: 108 MMHG | RESPIRATION RATE: 12 BRPM | OXYGEN SATURATION: 81 % | HEART RATE: 69 BPM

## 2024-10-02 VITALS
HEART RATE: 65 BPM | RESPIRATION RATE: 14 BRPM | SYSTOLIC BLOOD PRESSURE: 103 MMHG | OXYGEN SATURATION: 96 % | DIASTOLIC BLOOD PRESSURE: 65 MMHG

## 2024-10-02 VITALS
OXYGEN SATURATION: 95 % | HEART RATE: 63 BPM | DIASTOLIC BLOOD PRESSURE: 62 MMHG | RESPIRATION RATE: 11 BRPM | SYSTOLIC BLOOD PRESSURE: 99 MMHG

## 2024-10-02 VITALS
SYSTOLIC BLOOD PRESSURE: 104 MMHG | OXYGEN SATURATION: 98 % | HEART RATE: 65 BPM | RESPIRATION RATE: 14 BRPM | DIASTOLIC BLOOD PRESSURE: 61 MMHG | TEMPERATURE: 97.88 F

## 2024-10-02 VITALS
SYSTOLIC BLOOD PRESSURE: 104 MMHG | OXYGEN SATURATION: 97 % | RESPIRATION RATE: 12 BRPM | DIASTOLIC BLOOD PRESSURE: 61 MMHG | HEART RATE: 64 BPM

## 2024-10-02 VITALS
DIASTOLIC BLOOD PRESSURE: 64 MMHG | OXYGEN SATURATION: 58 % | RESPIRATION RATE: 13 BRPM | SYSTOLIC BLOOD PRESSURE: 101 MMHG | HEART RATE: 94 BPM

## 2024-10-02 VITALS — DIASTOLIC BLOOD PRESSURE: 63 MMHG | RESPIRATION RATE: 13 BRPM | SYSTOLIC BLOOD PRESSURE: 104 MMHG

## 2024-10-02 LAB
REFLEX LACTIC: (no result)
TROPONIN I: 0.03 NG/ML (ref 0–0.03)

## 2024-11-07 ENCOUNTER — HOSPITAL ENCOUNTER (OUTPATIENT)
Dept: HOSPITAL 22 - LAB.DROPOF | Age: 74
Discharge: HOME | End: 2024-11-07
Payer: MEDICARE

## 2024-11-07 DIAGNOSIS — I25.118: Primary | ICD-10-CM

## 2024-11-07 LAB
ALBUMIN LEVEL: 3.3 G/DL (ref 3.5–5)
ALBUMIN/GLOB SERPL: 1 {RATIO} (ref 1.1–1.8)
ALP ISO SERPL-ACNC: 87 U/L (ref 38–126)
ALT SERPLBLD-CCNC: 12 U/L (ref 12–78)
ANION GAP SERPL CALC-SCNC: 16.1 MEQ/L (ref 5–15)
AST SERPL QL: 21 U/L (ref 17–59)
BILIRUBIN,TOTAL: 0.5 MG/DL (ref 0.2–1.3)
BUN SERPL-MCNC: 52 MG/DL (ref 9–20)
CALCIUM SPEC-MCNC: 7.6 MG/DL (ref 8.4–10.2)
CHLORIDE SPEC-SCNC: 101 MMOL/L (ref 98–107)
CO2 SERPL-SCNC: 19 MMOL/L (ref 22–30)
CREAT BLD-SCNC: 3.7 MG/DL (ref 0.66–1.25)
ESTIMATED GLOMERULAR FILT RATE: 16 ML/MIN (ref 60–?)
GFR (AFRICAN AMERICAN): 20 ML/MIN (ref 60–?)
GLOBULIN SER CALC-MCNC: 3.2 G/DL (ref 1.3–3.2)
GLUCOSE: 93 MG/DL (ref 74–100)
HCT VFR BLD CALC: 28.7 % (ref 42–52)
HGB BLD-MCNC: 9.5 G/DL (ref 14.1–18)
MCHC RBC-ENTMCNC: 33.1 G/DL (ref 31.8–35.4)
MCV RBC: 87 FL (ref 80–94)
MEAN CORPUSCULAR HEMOGLOBIN: 28.8 PG (ref 27–31.2)
PLATELET # BLD: 289 K/MM3 (ref 142–424)
POTASSIUM: 5.1 MMOL/L (ref 3.5–5.1)
PROT SERPL-MCNC: 6.5 G/DL (ref 6.3–8.2)
RBC # BLD AUTO: 3.3 M/MM3 (ref 4.6–6.2)
SODIUM SPEC-SCNC: 131 MMOL/L (ref 136–145)
WBC # BLD AUTO: 14.8 K/MM3 (ref 4.8–10.8)

## 2024-11-07 PROCEDURE — 80053 COMPREHEN METABOLIC PANEL: CPT

## 2024-11-07 PROCEDURE — 85025 COMPLETE CBC W/AUTO DIFF WBC: CPT

## 2024-11-11 ENCOUNTER — HOSPITAL ENCOUNTER (OUTPATIENT)
Dept: HOSPITAL 22 - RT | Age: 74
Discharge: HOME | End: 2024-11-11
Payer: MEDICARE

## 2024-11-11 DIAGNOSIS — M25.561: ICD-10-CM

## 2024-11-11 DIAGNOSIS — I25.118: ICD-10-CM

## 2024-11-11 DIAGNOSIS — M79.89: Primary | ICD-10-CM

## 2024-11-11 LAB
ALBUMIN LEVEL: 3.3 G/DL (ref 3.5–5)
ALBUMIN/GLOB SERPL: 1 {RATIO} (ref 1.1–1.8)
ALP ISO SERPL-ACNC: 78 U/L (ref 38–126)
ALT SERPLBLD-CCNC: 12 U/L (ref 12–78)
ANION GAP SERPL CALC-SCNC: 16 MEQ/L (ref 5–15)
AST SERPL QL: 22 U/L (ref 17–59)
BILIRUBIN,TOTAL: 0.5 MG/DL (ref 0.2–1.3)
BUN SERPL-MCNC: 50 MG/DL (ref 9–20)
CALCIUM SPEC-MCNC: 7.8 MG/DL (ref 8.4–10.2)
CHLORIDE SPEC-SCNC: 106 MMOL/L (ref 98–107)
CO2 SERPL-SCNC: 19 MMOL/L (ref 22–30)
CREAT BLD-SCNC: 3.4 MG/DL (ref 0.66–1.25)
ESTIMATED GLOMERULAR FILT RATE: 18 ML/MIN (ref 60–?)
GFR (AFRICAN AMERICAN): 22 ML/MIN (ref 60–?)
GLOBULIN SER CALC-MCNC: 3.3 G/DL (ref 1.3–3.2)
GLUCOSE: 102 MG/DL (ref 74–100)
HCT VFR BLD CALC: 27.8 % (ref 42–52)
HGB BLD-MCNC: 9.2 G/DL (ref 14.1–18)
MCHC RBC-ENTMCNC: 33 G/DL (ref 31.8–35.4)
MCV RBC: 88.3 FL (ref 80–94)
MEAN CORPUSCULAR HEMOGLOBIN: 29.2 PG (ref 27–31.2)
PLATELET # BLD: 274 K/MM3 (ref 142–424)
POTASSIUM: 5 MMOL/L (ref 3.5–5.1)
PROT SERPL-MCNC: 6.6 G/DL (ref 6.3–8.2)
RBC # BLD AUTO: 3.15 M/MM3 (ref 4.6–6.2)
SODIUM SPEC-SCNC: 136 MMOL/L (ref 136–145)
WBC # BLD AUTO: 12.9 K/MM3 (ref 4.8–10.8)

## 2024-11-11 PROCEDURE — 85025 COMPLETE CBC W/AUTO DIFF WBC: CPT

## 2024-11-11 PROCEDURE — 80053 COMPREHEN METABOLIC PANEL: CPT

## 2024-11-11 PROCEDURE — 73562 X-RAY EXAM OF KNEE 3: CPT

## 2024-11-11 PROCEDURE — 93971 EXTREMITY STUDY: CPT

## 2024-11-26 ENCOUNTER — HOSPITAL ENCOUNTER (OUTPATIENT)
Dept: HOSPITAL 22 - LAB.DROPOF | Age: 74
Discharge: HOME | End: 2024-11-26
Payer: MEDICARE

## 2024-11-26 DIAGNOSIS — I25.10: ICD-10-CM

## 2024-11-26 DIAGNOSIS — N18.4: Primary | ICD-10-CM

## 2024-11-26 LAB
ALBUMIN LEVEL: 3.9 G/DL (ref 3.5–5)
ALBUMIN/GLOB SERPL: 1.1 {RATIO} (ref 1.1–1.8)
ALP ISO SERPL-ACNC: 96 U/L (ref 38–126)
ALT SERPLBLD-CCNC: 10 U/L (ref 12–78)
ANION GAP SERPL CALC-SCNC: 16.8 MEQ/L (ref 5–15)
AST SERPL QL: 21 U/L (ref 17–59)
BILIRUBIN,TOTAL: 0.6 MG/DL (ref 0.2–1.3)
BUN SERPL-MCNC: 53 MG/DL (ref 9–20)
CALCIUM SPEC-MCNC: 8.8 MG/DL (ref 8.4–10.2)
CHLORIDE SPEC-SCNC: 106 MMOL/L (ref 98–107)
CO2 SERPL-SCNC: 18 MMOL/L (ref 22–30)
CREAT BLD-SCNC: 3.7 MG/DL (ref 0.66–1.25)
ESTIMATED GLOMERULAR FILT RATE: 16 ML/MIN (ref 60–?)
GFR (AFRICAN AMERICAN): 20 ML/MIN (ref 60–?)
GLOBULIN SER CALC-MCNC: 3.4 G/DL (ref 1.3–3.2)
GLUCOSE: 97 MG/DL (ref 74–100)
HCT VFR BLD CALC: 32.7 % (ref 42–52)
HGB BLD-MCNC: 10.6 G/DL (ref 14.1–18)
MCHC RBC-ENTMCNC: 32.4 G/DL (ref 31.8–35.4)
MCV RBC: 90 FL (ref 80–94)
MEAN CORPUSCULAR HEMOGLOBIN: 29.1 PG (ref 27–31.2)
PLATELET # BLD: 255 K/MM3 (ref 142–424)
POTASSIUM: 4.8 MMOL/L (ref 3.5–5.1)
PROT SERPL-MCNC: 7.3 G/DL (ref 6.3–8.2)
RBC # BLD AUTO: 3.63 M/MM3 (ref 4.6–6.2)
SODIUM SPEC-SCNC: 136 MMOL/L (ref 136–145)
WBC # BLD AUTO: 13.8 K/MM3 (ref 4.8–10.8)

## 2024-11-26 PROCEDURE — 80053 COMPREHEN METABOLIC PANEL: CPT

## 2024-11-26 PROCEDURE — 85025 COMPLETE CBC W/AUTO DIFF WBC: CPT

## 2024-12-11 ENCOUNTER — HOSPITAL ENCOUNTER (OUTPATIENT)
Dept: HOSPITAL 22 - RAD | Age: 74
Discharge: HOME | End: 2024-12-11
Payer: MEDICARE

## 2024-12-11 DIAGNOSIS — M25.522: Primary | ICD-10-CM

## 2024-12-11 PROCEDURE — 73080 X-RAY EXAM OF ELBOW: CPT

## 2024-12-31 ENCOUNTER — HOSPITAL ENCOUNTER (OUTPATIENT)
Dept: HOSPITAL 22 - LAB.DROPOF | Age: 74
Discharge: HOME | End: 2024-12-31
Payer: MEDICARE

## 2024-12-31 DIAGNOSIS — B95.4: ICD-10-CM

## 2024-12-31 DIAGNOSIS — N39.0: Primary | ICD-10-CM

## 2024-12-31 PROCEDURE — 87088 URINE BACTERIA CULTURE: CPT

## 2024-12-31 PROCEDURE — 87186 SC STD MICRODIL/AGAR DIL: CPT

## 2024-12-31 PROCEDURE — 87086 URINE CULTURE/COLONY COUNT: CPT

## 2025-04-03 ENCOUNTER — HOSPITAL ENCOUNTER (OUTPATIENT)
Dept: HOSPITAL 22 - RAD | Age: 75
Discharge: HOME | End: 2025-04-03
Payer: MEDICARE

## 2025-04-03 DIAGNOSIS — I71.40: ICD-10-CM

## 2025-04-03 DIAGNOSIS — R91.1: ICD-10-CM

## 2025-04-03 DIAGNOSIS — M54.50: Primary | ICD-10-CM

## 2025-04-03 LAB
BUN SERPL-MCNC: 77 MG/DL (ref 9–20)
ESTIMATED GLOMERULAR FILT RATE: 10 ML/MIN (ref 60–?)
GFR (AFRICAN AMERICAN): 12 ML/MIN (ref 60–?)

## 2025-04-03 PROCEDURE — 84520 ASSAY OF UREA NITROGEN: CPT

## 2025-04-03 PROCEDURE — 71250 CT THORAX DX C-: CPT

## 2025-04-03 PROCEDURE — 82565 ASSAY OF CREATININE: CPT

## 2025-04-03 PROCEDURE — 72100 X-RAY EXAM L-S SPINE 2/3 VWS: CPT

## 2025-04-03 PROCEDURE — 74176 CT ABD & PELVIS W/O CONTRAST: CPT

## 2025-04-03 PROCEDURE — 36415 COLL VENOUS BLD VENIPUNCTURE: CPT

## 2025-04-30 PROBLEM — I71.43 INFRARENAL ABDOMINAL AORTIC ANEURYSM (AAA) WITHOUT RUPTURE: Status: ACTIVE | Noted: 2025-04-30

## 2025-04-30 NOTE — PROGRESS NOTES
[facility] Cardiothoracic Surgery New Patient Office Note     Date of Encounter: 2025     Name: Chandan Salcedo  : 1950     Referred By: Sam Kirkpatrick MD  PCP: Sam Kirkpatrick MD    Chief Complaint:    Chief Complaint   Patient presents with    Aortic Aneurysm     Np referred for an abdominal aortic aneurysm,complains of back pain.       Subjective      History of Present Illness:    Chandan Salcedo is a 75 y.o. male new patient referred to Dr. Urbina per PCP Dr. Sam Kirkpatrick for infrarenal AAA.  PMH: Current smoker, COPD, CKD (followed by Nephrology), CAD/CHF (not followed by Cardiology), chronic lumbar disc disease, PVD, pulmonary nodule (followed by PCP), neurogenic bladder (follows w/ Urology), and infrarenal AAA. Hx hospitalization 2024 and 2025 for [place]/ urosepsis.  Reports chronic back/abdominal pain but no new or unusual pain.  BP not monitored at home.  No FMH aneurysmal disease.      Review of Systems:  Review of Systems   Constitutional: Negative. Negative for chills, decreased appetite, diaphoresis, fever, malaise/fatigue, night sweats and weight loss.   HENT:  Positive for hearing loss (wears hearing aids) and hoarse voice. Negative for congestion and sore throat.    Cardiovascular:  Positive for dyspnea on exertion. Negative for irregular heartbeat, leg swelling, near-syncope, orthopnea, palpitations, paroxysmal nocturnal dyspnea and syncope.   Respiratory:  Positive for cough and shortness of breath. Negative for hemoptysis, sleep disturbances due to breathing, snoring, sputum production and wheezing.    Hematologic/Lymphatic: Negative for adenopathy and bleeding problem. Bruises/bleeds easily.   Skin: Negative.  Negative for color change, dry skin, itching, poor wound healing and rash.   Musculoskeletal:  Positive for back pain, falls and joint pain. Negative for muscle weakness.   Gastrointestinal:  Positive for abdominal pain. Negative for anorexia,  constipation, diarrhea, nausea and vomiting.   Genitourinary:  Positive for frequency. Negative for dysuria.   Neurological:  Positive for dizziness and loss of balance. Negative for difficulty with concentration, headaches, numbness, paresthesias, seizures and weakness.   Psychiatric/Behavioral: Negative.  Negative for altered mental status, depression and memory loss. The patient does not have insomnia and is not nervous/anxious.    Allergic/Immunologic: Negative.  Negative for persistent infections.       I have reviewed the following portions of the patient's history: problem list, current medications, allergies, past surgical history, past medical history, past social history, past family history, and ROS and confirm it's accurate.    Allergies:  No Known Allergies    Medications:      Current Outpatient Medications:     albuterol (ACCUNEB) 0.63 MG/3ML nebulizer solution, Take 3 mL by nebulization Every 6 (Six) Hours As Needed., Disp: , Rfl:     amoxicillin-clavulanate (AUGMENTIN) 875-125 MG per tablet, Take 1 tablet by mouth 3 (Three) Times a Day., Disp: , Rfl:     aspirin 81 MG EC tablet, Take 1 tablet by mouth Daily., Disp: , Rfl:     Breo Ellipta 200-25 MCG/ACT inhaler, Inhale 1 puff Daily., Disp: , Rfl:     carvedilol (COREG) 3.125 MG tablet, Take 1 tablet by mouth 2 (Two) Times a Day With Meals., Disp: , Rfl:     Combivent Respimat  MCG/ACT inhaler, Inhale 1 puff 4 (Four) Times a Day., Disp: , Rfl:     finasteride (PROSCAR) 5 MG tablet, Take 1 tablet by mouth Daily., Disp: , Rfl:     levothyroxine (SYNTHROID, LEVOTHROID) 50 MCG tablet, Take 1 tablet by mouth Every Morning., Disp: , Rfl:     lovastatin (MEVACOR) 20 MG tablet, Take 1 tablet by mouth Every Night., Disp: , Rfl:     metoprolol succinate XL (TOPROL-XL) 25 MG 24 hr tablet, Take 1 tablet by mouth Daily., Disp: , Rfl:     omeprazole (priLOSEC) 40 MG capsule, Take 1 capsule by mouth Daily., Disp: , Rfl:     oxybutynin (DITROPAN) 5 MG tablet,  "Take 1 tablet by mouth Daily., Disp: , Rfl:     pantoprazole (PROTONIX) 40 MG EC tablet, Take 1 tablet by mouth Daily., Disp: , Rfl:     pregabalin (LYRICA) 25 MG capsule, Take 1 capsule by mouth 2 (Two) Times a Day., Disp: , Rfl:     sildenafil (VIAGRA) 100 MG tablet, Take 1 tablet by mouth As Needed., Disp: , Rfl:     tiZANidine (ZANAFLEX) 2 MG tablet, Take 1 tablet by mouth Every 8 (Eight) Hours As Needed., Disp: , Rfl:     History:   Past Medical History:   Diagnosis Date    Aneurysm     Arthritis     Cataract     Chronic kidney disease     COPD (chronic obstructive pulmonary disease)     Coronary artery disease     Disease of thyroid gland     GERD (gastroesophageal reflux disease)     History of transfusion     Hyperlipidemia     Hypertension     Inguinal hernia     Myocardial infarction        Past Surgical History:   Procedure Laterality Date    CATARACT EXTRACTION Bilateral     CORONARY STENT PLACEMENT      INGUINAL HERNIA REPAIR Bilateral     KNEE SURGERY         Social History     Socioeconomic History    Marital status:     Number of children: 2   Tobacco Use    Smoking status: Every Day     Current packs/day: 1.00     Average packs/day: 1 pack/day for 54.3 years (54.3 ttl pk-yrs)     Types: Cigarettes     Start date: 1971    Smokeless tobacco: Former    Tobacco comments:     Has smoked up to 3 ppd in the past   Vaping Use    Vaping status: Never Used   Substance and Sexual Activity    Alcohol use: Not Currently    Drug use: Never    Sexual activity: Defer        Family History   Problem Relation Age of Onset    Alzheimer's disease Mother     Diabetes Mother     Diabetes Father     Pneumonia Father        Objective   Physical Exam:  Vitals:    05/01/25 0837 05/01/25 0838   BP: 126/70 130/80   BP Location: Right arm Left arm   Patient Position: Sitting Sitting   Pulse: 57    Temp: 97.3 °F (36.3 °C)    SpO2: 94%    Weight: 71.7 kg (158 lb)    Height: 182.9 cm (72\")  Comment: patient reports     "   Body mass index is 21.43 kg/m².    Physical Exam  Vitals reviewed.   Constitutional:       General: He is not in acute distress.     Appearance: He is not toxic-appearing.   HENT:      Head: Normocephalic and atraumatic.   Eyes:      General: Lids are normal.      Conjunctiva/sclera: Conjunctivae normal.      Pupils: Pupils are equal, round, and reactive to light.   Neck:      Vascular: No carotid bruit.   Cardiovascular:      Rate and Rhythm: Normal rate and regular rhythm.      Heart sounds: S1 normal and S2 normal. No murmur heard.  Pulmonary:      Effort: Pulmonary effort is normal. No respiratory distress.      Breath sounds: No decreased breath sounds, wheezing, rhonchi or rales.   Musculoskeletal:         General: Normal range of motion.      Cervical back: Normal range of motion and neck supple.      Right lower leg: No edema.      Left lower leg: No edema.   Lymphadenopathy:      Cervical: No cervical adenopathy.      Upper Body:      Right upper body: No supraclavicular adenopathy.      Left upper body: No supraclavicular adenopathy.   Skin:     General: Skin is warm and dry.      Capillary Refill: Capillary refill takes less than 2 seconds.   Neurological:      General: No focal deficit present.      Mental Status: He is alert and oriented to person, place, and time.   Psychiatric:         Attention and Perception: Attention normal.         Mood and Affect: Mood normal.         Speech: Speech normal.         Behavior: Behavior normal. Behavior is cooperative.         Imaging/Labs:  CT abdomen and pelvis without contrast 4/3/2025 @ [facility] (Personally reviewed and measure infrarenal AAA 5.0 cm)  Impression:  Mild to moderate bilateral hydronephrosis, improved from previous exam.  Interval enlargement of abdominal aortic aneurysm now measuring up to 15 mm.  Uncomplicated cholelithiasis  Zechariah Dale MD     CT abdomen and pelvis without contrast 7/8/2024 @ [facility] (Personally reviewed  and measure AAA 4.6 cm)  Findings:   Atherosclerotic disease of the aorta and iliac arteries with infrarenal fusiform abdominal aortic aneurysm measuring approximately 4.5 cm in AP diameter by 4.7 cm in transverse dimensions.  No evidence of leakage or rupture.    Assessment / Plan      Assessment / Plan:  1. Infrarenal abdominal aortic aneurysm (AAA) without rupture  - 75 y.o. male new patient referred to Dr. Urbina per PCP, Dr. Sam Kirkpatrick, for infrarenal AAA.   - PMH: Current smoker, COPD, CKD (followed by Nephrology), CAD/CHF (not followed by Cardiology), chronic lumbar disc disease, PVD, pulmonary nodule (followed by PCP), neurogenic bladder (follows w/ Urology), and infrarenal AAA.   - CT imaging 7/2024: 4.6 cm fusiform infrarenal AAA  - CT imaging 4/2025: fusiform infrarenal AAA 5.0 cm  - Reviewed general information regarding pathophysiology of aneurysmal disease, intervention size 5.5 cm, and general information about endograft repair  - Will plan short interval follow up CTA imaging in 6 months with clinic re-visit at that time (will need Nephrology clearance for CTA imaging with IV fluid protocol)  - For any new or unusual abdominal pain or back pain, seek evaluation at nearest ER  - Discussed importance of smoking cessation  - Discussed BP goal 130/80 mmHg or less      Patient Education: Continue to maintain strict BP control w/ goal 130/80 mmHg or less.  Continue to avoid tobacco use.      Patient Education: Chandan FERRIS Matias  reports that he has been smoking cigarettes. He started smoking about 54 years ago. He has a 54.3 pack-year smoking history. He has quit using smokeless tobacco. I have educated him on the risk of diseases from using tobacco products such as cancer, COPD, heart disease, and arterial disease. I advised him to quit and he is not willing to quit. I spent 5 minutes counseling the patient.         Follow Up:   Return in about 6 months (around 11/1/2025) for CTA abd/pelvis +3D  reconstruction and IV fluid protocol.   Or sooner for any further concerns or worsening sign and symptoms. If unable to reach us in the office please dial 911 or go to the nearest emergency department.      JOHAN Sheikh  [facility] Cardiothoracic Surgery    Time Spent: I spent 45 minutes caring for Chandan on this date of service. This time includes time spent by me in the following activities: preparing for the visit, reviewing tests, obtaining and/or reviewing a separately obtained history, performing a medically appropriate examination and/or evaluation, counseling and educating the patient/family/caregiver, ordering medications, tests, or procedures, documenting information in the medical record, independently interpreting results and communicating that information with the patient/family/caregiver, and care coordination.

## 2025-05-01 ENCOUNTER — OFFICE VISIT (OUTPATIENT)
Dept: CARDIAC SURGERY | Facility: CLINIC | Age: 75
End: 2025-05-01
Payer: MEDICARE

## 2025-05-01 VITALS
HEIGHT: 72 IN | BODY MASS INDEX: 21.4 KG/M2 | DIASTOLIC BLOOD PRESSURE: 80 MMHG | SYSTOLIC BLOOD PRESSURE: 130 MMHG | OXYGEN SATURATION: 94 % | WEIGHT: 158 LBS | TEMPERATURE: 97.3 F | HEART RATE: 57 BPM

## 2025-05-01 DIAGNOSIS — I71.43 INFRARENAL ABDOMINAL AORTIC ANEURYSM (AAA) WITHOUT RUPTURE: Primary | ICD-10-CM

## 2025-05-01 PROCEDURE — 1159F MED LIST DOCD IN RCRD: CPT | Performed by: NURSE PRACTITIONER

## 2025-05-01 PROCEDURE — 1160F RVW MEDS BY RX/DR IN RCRD: CPT | Performed by: NURSE PRACTITIONER

## 2025-05-01 PROCEDURE — 99204 OFFICE O/P NEW MOD 45 MIN: CPT | Performed by: NURSE PRACTITIONER

## 2025-05-01 RX ORDER — IPRATROPIUM BROMIDE AND ALBUTEROL 20; 100 UG/1; UG/1
1 SPRAY, METERED RESPIRATORY (INHALATION)
COMMUNITY

## 2025-05-01 RX ORDER — FINASTERIDE 5 MG/1
5 TABLET, FILM COATED ORAL DAILY
COMMUNITY

## 2025-05-01 RX ORDER — FLUTICASONE FUROATE AND VILANTEROL TRIFENATATE 200; 25 UG/1; UG/1
1 POWDER RESPIRATORY (INHALATION)
COMMUNITY
Start: 2025-04-02

## 2025-05-01 RX ORDER — ALBUTEROL SULFATE 0.63 MG/3ML
1 SOLUTION RESPIRATORY (INHALATION) EVERY 6 HOURS PRN
COMMUNITY

## 2025-05-01 RX ORDER — OMEPRAZOLE 40 MG/1
40 CAPSULE, DELAYED RELEASE ORAL DAILY
COMMUNITY

## 2025-05-01 RX ORDER — ASPIRIN 81 MG/1
81 TABLET ORAL DAILY
COMMUNITY

## 2025-05-01 RX ORDER — LOVASTATIN 20 MG/1
20 TABLET ORAL NIGHTLY
COMMUNITY

## 2025-05-01 RX ORDER — OXYBUTYNIN CHLORIDE 5 MG/1
5 TABLET ORAL DAILY
COMMUNITY

## 2025-05-01 RX ORDER — PANTOPRAZOLE SODIUM 40 MG/1
40 TABLET, DELAYED RELEASE ORAL DAILY
COMMUNITY

## 2025-05-01 RX ORDER — METOPROLOL SUCCINATE 25 MG/1
1 TABLET, EXTENDED RELEASE ORAL DAILY
COMMUNITY

## 2025-05-01 RX ORDER — PREGABALIN 25 MG/1
25 CAPSULE ORAL 2 TIMES DAILY
COMMUNITY

## 2025-05-01 RX ORDER — SILDENAFIL 100 MG/1
100 TABLET, FILM COATED ORAL AS NEEDED
COMMUNITY
Start: 2025-04-25

## 2025-05-01 RX ORDER — CARVEDILOL 3.12 MG/1
3.12 TABLET ORAL 2 TIMES DAILY WITH MEALS
COMMUNITY

## 2025-05-01 RX ORDER — TIZANIDINE 2 MG/1
2 TABLET ORAL EVERY 8 HOURS PRN
COMMUNITY

## 2025-05-01 RX ORDER — LEVOTHYROXINE SODIUM 50 UG/1
50 TABLET ORAL
COMMUNITY

## 2025-08-12 DIAGNOSIS — I71.43 INFRARENAL ABDOMINAL AORTIC ANEURYSM (AAA) WITHOUT RUPTURE: Primary | ICD-10-CM
